# Patient Record
Sex: FEMALE | Race: BLACK OR AFRICAN AMERICAN | NOT HISPANIC OR LATINO | Employment: FULL TIME | URBAN - METROPOLITAN AREA
[De-identification: names, ages, dates, MRNs, and addresses within clinical notes are randomized per-mention and may not be internally consistent; named-entity substitution may affect disease eponyms.]

---

## 2017-01-04 NOTE — PROGRESS NOTES
"  SUBJECTIVE:                                                    Ruthy Cameron is a 36 year old female who presents to clinic today for the following health issues:      Patient here to have her right big toe nail looked at. Pain is 10/10 and she is unable to walk from the pain. Patient reported the pain started about a month ago but has gotten worse.     First noticed it about a month ago. She has had this rmoved before. No pus or drainage. Even a light touch hurts. Can't put her shoe on all the way.     Problem list and histories reviewed & adjusted, as indicated.  Additional history: as documented    Problem list, Medication list, Allergies, and Medical/Social/Surgical histories reviewed in [facility] and updated as appropriate.    ROS:  C: NEGATIVE for fever, chills, change in weight  E/M: NEGATIVE for ear, mouth and throat problems  R: NEGATIVE for significant cough or SOB  CV: NEGATIVE for chest pain, palpitations or peripheral edema    OBJECTIVE:                                                    /85 mmHg  Pulse 87  Temp(Src) 98.5  F (36.9  C) (Oral)  Ht 5' 5.5\" (1.664 m)  Wt 208 lb (94.348 kg)  BMI 34.07 kg/m2  SpO2 99%  LMP 12/26/2016  Body mass index is 34.07 kg/(m^2).  GENERAL: healthy, alert and no distress  SKIN: Right great toe with nail curling in on both edges. Pain with even light touch to the surrounding skin.     Diagnostic Test Results:  none      ASSESSMENT/PLAN:                                                    1. Ingrowing nail, right great toe  Referred to podiatry. Tramadol for pain to tolerate show.   - traMADol (ULTRAM) 50 MG tablet; Take 1-2 tablets ( mg) by mouth every 6 hours as needed for pain maximum 4 tablet(s) per day  Dispense: 20 tablet; Refill: 0  - PODIATRY/FOOT & ANKLE SURGERY REFERRAL        Elinor Dickey PA-C  [facility]    "

## 2017-01-05 ENCOUNTER — OFFICE VISIT (OUTPATIENT)
Dept: PODIATRY | Facility: CLINIC | Age: 37
End: 2017-01-05
Payer: COMMERCIAL

## 2017-01-05 ENCOUNTER — OFFICE VISIT (OUTPATIENT)
Dept: FAMILY MEDICINE | Facility: CLINIC | Age: 37
End: 2017-01-05
Payer: COMMERCIAL

## 2017-01-05 VITALS
DIASTOLIC BLOOD PRESSURE: 85 MMHG | TEMPERATURE: 98.5 F | OXYGEN SATURATION: 99 % | HEIGHT: 66 IN | BODY MASS INDEX: 33.43 KG/M2 | SYSTOLIC BLOOD PRESSURE: 135 MMHG | HEART RATE: 87 BPM | WEIGHT: 208 LBS

## 2017-01-05 VITALS — WEIGHT: 208 LBS | BODY MASS INDEX: 34.07 KG/M2 | OXYGEN SATURATION: 98 % | HEART RATE: 86 BPM

## 2017-01-05 DIAGNOSIS — L60.0 INGROWING NAIL, RIGHT GREAT TOE: Primary | ICD-10-CM

## 2017-01-05 DIAGNOSIS — L60.0 INGROWING NAIL: Primary | ICD-10-CM

## 2017-01-05 PROCEDURE — 99213 OFFICE O/P EST LOW 20 MIN: CPT | Performed by: PHYSICIAN ASSISTANT

## 2017-01-05 PROCEDURE — 99203 OFFICE O/P NEW LOW 30 MIN: CPT | Mod: 25 | Performed by: PODIATRIST

## 2017-01-05 PROCEDURE — 11730 AVULSION NAIL PLATE SIMPLE 1: CPT | Mod: T5 | Performed by: PODIATRIST

## 2017-01-05 RX ORDER — TRAMADOL HYDROCHLORIDE 50 MG/1
50-100 TABLET ORAL EVERY 6 HOURS PRN
Qty: 20 TABLET | Refills: 0 | Status: SHIPPED | OUTPATIENT
Start: 2017-01-05 | End: 2018-01-08

## 2017-01-05 ASSESSMENT — PAIN SCALES - GENERAL: PAINLEVEL: WORST PAIN (10)

## 2017-01-05 NOTE — PROGRESS NOTES
Subjective:    Pt is seen today in consult from Elinor Dickey w/ kajal c/c of a painful ingrown right great nail both border.  This has been problematic for 1 month(s).  negativehistory of drainage from the site. This is slowly getting worse.  Aggravated by activity and relieved by rest.  Has tried soaking which has not helped.   denies history of trauma to the area.  Was on Lamisil in the past for three months but did not change nails.  Smoker.  She drives for [employer].      ROS:  Denies fever and chill, denies numbness and tingling, denies erythema on dorsum of foot.    Past Medical History   Diagnosis Date     SYD II (cervical intraepithelial neoplasia II) 6/18/98     High risk HPV infection 4/23/13     normal pap     LSIL (low grade squamous intraepithelial lesion) on Pap smear 5/2014     + HR HPV 58.      High risk HPV infection 5/6/15     colp 6/30/15 no high grade dysplasia       Past Surgical History   Procedure Laterality Date     No history of surgery         Family History   Problem Relation Age of Onset     Neurologic Disorder Father 40     Brain Aneurysm     Hypertension Father      CANCER Paternal Grandmother      Breast Cancer Other        Social History   Substance Use Topics     Smoking status: Current Every Day Smoker -- 0.50 packs/day     Types: Cigarettes     Smokeless tobacco: Never Used     Alcohol Use: Yes      Comment: Monthly         Current outpatient prescriptions:      traMADol (ULTRAM) 50 MG tablet, Take 1-2 tablets ( mg) by mouth every 6 hours as needed for pain maximum 4 tablet(s) per day, Disp: 20 tablet, Rfl: 0     etonogestrel-ethinyl estradiol (NUVARING) 0.12-0.015 MG/24HR vaginal ring, Place 1 each vaginally every 28 days, Disp: 3 each, Rfl: 3     omeprazole (PRILOSEC) 20 MG capsule, Take 1 capsule (20 mg) by mouth daily, Disp: 90 capsule, Rfl: 3     terbinafine (LAMISIL) 250 MG tablet, Take 1 tablet (250 mg) by mouth daily, Disp: 30 tablet, Rfl: 2     No Known Allergies    Pulse 86   Wt 94.348 kg (208 lb)  SpO2 98%  LMP 12/26/2016.  A&O X 3.  Good historian.  Pulses DP, PT 2/4 b/l.  CRT < 3 seconds X 10 digits.  No edema or varicosities noted.  Sensation to light touch intact b/l.  Reflexes 2/4 b/l.  Skin has normal texture and turgor b/l.  Normal arch with weightbearing.  No forefoot or rear foot deformities noted.  MS 5/5 all compartments.  Normal ROM all fore foot and rearfoot joints.  No equinus.  right great toe nailboth border shows soft tissue impingement with localized erythema.   negative active drainage/purulence at this time.  No sinus tracts.  No nailbed masses or exostosis.  No pain with range of motion of IPJ or MTPJ.  No ascending cellulitis.    ASSESSMENT:    Onychocryptosis with paronychia right toe.    Discussed etiology and treatment options in detail w/ the pt.  The potential causes and nature of an ingrown toenail were discussed with the patient.  We reviewed the natural history/prognosis of the condition and potential risks if no treatment is provided.      Treatment options discussed included conservative management (oral antibiotics, soaking of foot, adequate width shoes)  as well as surgical management (partial or total nail removal).  The pros and cons of both forms of treatment were reviewed.  Handout given to patient.      After thorough discussion and answering all questions, the patient elected to have nail avulsion.  Obtained consent, used 3cc of 1% lidocaine plain to block right first toe.  Sterile prep, then avulsed the affected border(s).  No evidence of deep abscess noted.  Pt tolerated procedure well.  Sterile bandage placed, gave wound care instruction.  If returns in the future discussed permanent.  Return to clinic prn.  Thank you for allowing me participate in the care of this patient.        Figueroa Saleh DPM, FACFAS

## 2017-01-05 NOTE — MR AVS SNAPSHOT
After Visit Summary   1/5/2017    Ruthy Cameron    MRN: [id number]           Patient Information     Date Of Birth          1980        Visit Information        Provider Department      1/5/2017 9:00 AM Elinor Dickey PA-C [facility]        Today's Diagnoses     Ingrowing nail, right great toe    -  1        Follow-ups after your visit        Additional Services     PODIATRY/FOOT & ANKLE SURGERY REFERRAL       Your provider has referred you to: [facility]: [facility] [phone]   [email or web address]    Please be aware that coverage of these services is subject to the terms and limitations of your health insurance plan.  Call member services at your health plan with any benefit or coverage questions.      Please bring the following to your appointment:  >>   Any x-rays, CTs or MRIs which have been performed.  Contact the facility where they were done to arrange for  prior to your scheduled appointment.    >>   List of current medications   >>   This referral request   >>   Any documents/labs given to you for this referral                  Your next 10 appointments already scheduled     Jan 05, 2017 12:45 PM   New Visit with Figueroa Saleh DPM   [facility] ([facility])    [address] [place] [id number] [phone]              Who to contact     If you have questions or need follow up information about today's clinic visit or your schedule please contact [facility] directly at [phone].  Normal or non-critical lab and imaging results will be communicated to you by MyChart, letter or phone within 4 business days after the clinic has received the results. If you do not hear from us within 7 days, please contact the clinic through MyChart or phone. If you have a critical or abnormal lab result, we will notify you by phone as soon as  "possible.  Submit refill requests through [employer] or call your pharmacy and they will forward the refill request to us. Please allow 3 business days for your refill to be completed.          Additional Information About Your Visit        [employer] Information     [employer] lets you send messages to your doctor, view your test results, renew your prescriptions, schedule appointments and more. To sign up, go to [email or web address] . Click on \"Log in\" on the left side of the screen, which will take you to the Welcome page. Then click on \"Sign up Now\" on the right side of the page.     You will be asked to enter the access code listed below, as well as some personal information. Please follow the directions to create your username and password.     Your access code is: [email or web address]  Expires: 2017  9:22 AM     Your access code will  in 90 days. If you need help or a new code, please call your [place] clinic or [phone].        Care EveryWhere ID     This is your Care EveryWhere ID. This could be used by other organizations to access your [place] medical records  [id number]        Your Vitals Were     Pulse Temperature Height BMI (Body Mass Index) Pulse Oximetry Last Period    87 98.5  F (36.9  C) (Oral) 5' 5.5\" (1.664 m) 34.07 kg/m2 99% 2016       Blood Pressure from Last 3 Encounters:   17 135/85   16 128/84   08/15/16 115/75    Weight from Last 3 Encounters:   17 208 lb (94.348 kg)   16 200 lb (90.719 kg)   08/15/16 201 lb (91.173 kg)              We Performed the Following     PODIATRY/FOOT & ANKLE SURGERY REFERRAL          Today's Medication Changes          These changes are accurate as of: 17  9:22 AM.  If you have any questions, ask your nurse or doctor.               Start taking these medicines.        Dose/Directions    traMADol 50 MG tablet   Commonly known as:  ULTRAM   Used for:  Ingrowing nail, right great toe   Started by:  Elinor Dickey PA-C        " Dose:   mg   Take 1-2 tablets ( mg) by mouth every 6 hours as needed for pain maximum 4 tablet(s) per day   Quantity:  20 tablet   Refills:  0            Where to get your medicines      Some of these will need a paper prescription and others can be bought over the counter.  Ask your nurse if you have questions.     Bring a paper prescription for each of these medications    - traMADol 50 MG tablet             Primary Care Provider Office Phone # Fax #    Elinor Dickey PA-C [phone]       [facility] [address] [facility] [address]        Thank you!     Thank you for choosing [facility]  for your care. Our goal is always to provide you with excellent care. Hearing back from our patients is one way we can continue to improve our services. Please take a few minutes to complete the written survey that you may receive in the mail after your visit with us. Thank you!             Your Updated Medication List - Protect others around you: Learn how to safely use, store and throw away your medicines at www.disposemymeds.org.          This list is accurate as of: 1/5/17  9:22 AM.  Always use your most recent med list.                   Brand Name Dispense Instructions for use    etonogestrel-ethinyl estradiol 0.12-0.015 MG/24HR vaginal ring    NUVARING    3 each    Place 1 each vaginally every 28 days       omeprazole 20 MG CR capsule    priLOSEC    90 capsule    Take 1 capsule (20 mg) by mouth daily       terbinafine 250 MG tablet    lamISIL    30 tablet    Take 1 tablet (250 mg) by mouth daily       traMADol 50 MG tablet    ULTRAM    20 tablet    Take 1-2 tablets ( mg) by mouth every 6 hours as needed for pain maximum 4 tablet(s) per day

## 2017-01-05 NOTE — NURSING NOTE
"Chief Complaint   Patient presents with     Ingrown Toenail     R great toe       Initial Pulse 86  Wt 94.348 kg (208 lb)  SpO2 98%  LMP 12/26/2016 Estimated body mass index is 34.07 kg/(m^2) as calculated from the following:    Height as of an earlier encounter on 1/5/17: 1.664 m (5' 5.5\").    Weight as of this encounter: 94.348 kg (208 lb).  BP completed using cuff size: NA (Not Taken)  "

## 2017-01-05 NOTE — MR AVS SNAPSHOT
After Visit Summary   1/5/2017    Ruthy Cameron    MRN: [id number]           Patient Information     Date Of Birth          1980        Visit Information        Provider Department      1/5/2017 12:45 PM Figueroa Saleh, DAWNA [facility]        Today's Diagnoses     Ingrowing nail    -  1       Care Instructions    Smoking Cessation    What's in cigarette smoke? - Cigarette smoke contains over 4,000 chemicals. Nicotine is one of the main ingredients which is an insecticide/herbicide. It is poisonous to our nervous system, increases blood clotting risk, and decreases the body's defenses to fight off infection. Another chemical is Carbon Monoxide is an asphyxiating gas that permanently binds to blood cells and blocks the transport of oxygen. This leads to tissue death and decreases your metabolism. Tar is a chemical that coats your lungs and trachea which impairs new oxygen coming in and carbon dioxide getting out of your body.   How does smoking impact surgery? - Smoking is particularly hazardous with regards to surgery. Surgery puts stress on the body and a smoker's body is already under strain from these chemicals. Putting the two together, especially for an elective surgery, could be a recipe for disaster. Smoking before and after surgery increases your risk of heart problems, slow wound healing, delayed bone healing, blood clots, wound infection and anesthesia complications.   What are the benefits of quitting? - In 20 minutes your blood pressure will drop back down to normal. In 8 hours the carbon monoxide (a toxic gas) levels in your blood stream will drop by half, and oxygen levels will return to normal. In 48 hours your chance of having a heart attack will have decreased. All nicotine will have left your body. Your sense of taste and smell will return to a normal level. In 72 hours your bronchial tubes will relax, and your energy levels will increase. In 2 weeks your circulation  will increase, and it will continue to improve for the next 10 weeks.    Recommendations for elective surgery - Ideally, patients should quit smoking 8 weeks before and at least 2 weeks after elective surgery in order to avoid complications. Simply cutting back on the amount of cigarettes smoked per day does not offer any benefit or decrease the risk of poor wound healing, heart problems, and infection. Smokers should also start taking Vitamin C and B for two weeks before surgery and two weeks after surgery.    Ways to Stop Smokin. Nicotine patches, lozenges, or gum  2. Support Groups  3. Medications (see below)    List of Medications:  1. Varenicline Tartrate (CHANTIX)   2. Bupropion HCL (WELLBUTRIN, ZYBAN) - note: make sure Wellbutrin is for smoking cessation and not other issues   3. Nicotine Patch (NICODERM)   4. Nicotine Inhaler (NICOTROL)   5. Nicotine Gum Nicotine Polacrilex   6. Nicotine Lozenge: Nicotine Polacrilex (COMMIT)   * [employer] offers a smoking support group as well!  Please visit: [email or web address]  If you are interested in these, ask about getting a prescription or talk to your primary care doctor about what may be the best way for you to quit.       Weight management plan: Patient was referred to their PCP to discuss a diet and exercise plan.          Follow-ups after your visit        Who to contact     If you have questions or need follow up information about today's clinic visit or your schedule please contact [facility] directly at [phone].  Normal or non-critical lab and imaging results will be communicated to you by MyChart, letter or phone within 4 business days after the clinic has received the results. If you do not hear from us within 7 days, please contact the clinic through MyChart or phone. If you have a critical or abnormal lab result, we will notify you by phone as soon as possible.  Submit refill requests through [employer] or call your  "pharmacy and they will forward the refill request to us. Please allow 3 business days for your refill to be completed.          Additional Information About Your Visit        MyChart Information     [employer] lets you send messages to your doctor, view your test results, renew your prescriptions, schedule appointments and more. To sign up, go to [email or web address] . Click on \"Log in\" on the left side of the screen, which will take you to the Welcome page. Then click on \"Sign up Now\" on the right side of the page.     You will be asked to enter the access code listed below, as well as some personal information. Please follow the directions to create your username and password.     Your access code is: [email or web address]  Expires: 2017  9:22 AM     Your access code will  in 90 days. If you need help or a new code, please call your [place] clinic or [phone].        Care EveryWhere ID     This is your Care EveryWhere ID. This could be used by other organizations to access your [place] medical records  [id number]        Your Vitals Were     Pulse Pulse Oximetry Last Period             86 98% 2016          Blood Pressure from Last 3 Encounters:   17 135/85   16 128/84   08/15/16 115/75    Weight from Last 3 Encounters:   17 94.348 kg (208 lb)   17 94.348 kg (208 lb)   16 90.719 kg (200 lb)              We Performed the Following     REMOVAL OF NAIL PLATE SIMPLE SINGLE          Today's Medication Changes          These changes are accurate as of: 17  2:06 PM.  If you have any questions, ask your nurse or doctor.               Start taking these medicines.        Dose/Directions    traMADol 50 MG tablet   Commonly known as:  ULTRAM   Used for:  Ingrowing nail, right great toe   Started by:  Elinor Dickey PA-C        Dose:   mg   Take 1-2 tablets ( mg) by mouth every 6 hours as needed for pain maximum 4 tablet(s) per day   Quantity:  20 tablet   Refills:  " 0            Where to get your medicines      Some of these will need a paper prescription and others can be bought over the counter.  Ask your nurse if you have questions.     Bring a paper prescription for each of these medications    - traMADol 50 MG tablet             Primary Care Provider Office Phone # Fax #    Elinor Dickey PA-C [phone]       [facility] [address] [facility] [address]        Thank you!     Thank you for choosing [facility]  for your care. Our goal is always to provide you with excellent care. Hearing back from our patients is one way we can continue to improve our services. Please take a few minutes to complete the written survey that you may receive in the mail after your visit with us. Thank you!             Your Updated Medication List - Protect others around you: Learn how to safely use, store and throw away your medicines at www.disposemymeds.org.          This list is accurate as of: 1/5/17  2:06 PM.  Always use your most recent med list.                   Brand Name Dispense Instructions for use    etonogestrel-ethinyl estradiol 0.12-0.015 MG/24HR vaginal ring    NUVARING    3 each    Place 1 each vaginally every 28 days       omeprazole 20 MG CR capsule    priLOSEC    90 capsule    Take 1 capsule (20 mg) by mouth daily       terbinafine 250 MG tablet    lamISIL    30 tablet    Take 1 tablet (250 mg) by mouth daily       traMADol 50 MG tablet    ULTRAM    20 tablet    Take 1-2 tablets ( mg) by mouth every 6 hours as needed for pain maximum 4 tablet(s) per day

## 2017-01-05 NOTE — PATIENT INSTRUCTIONS
Smoking Cessation    What's in cigarette smoke? - Cigarette smoke contains over 4,000 chemicals. Nicotine is one of the main ingredients which is an insecticide/herbicide. It is poisonous to our nervous system, increases blood clotting risk, and decreases the body's defenses to fight off infection. Another chemical is Carbon Monoxide is an asphyxiating gas that permanently binds to blood cells and blocks the transport of oxygen. This leads to tissue death and decreases your metabolism. Tar is a chemical that coats your lungs and trachea which impairs new oxygen coming in and carbon dioxide getting out of your body.   How does smoking impact surgery? - Smoking is particularly hazardous with regards to surgery. Surgery puts stress on the body and a smoker's body is already under strain from these chemicals. Putting the two together, especially for an elective surgery, could be a recipe for disaster. Smoking before and after surgery increases your risk of heart problems, slow wound healing, delayed bone healing, blood clots, wound infection and anesthesia complications.   What are the benefits of quitting? - In 20 minutes your blood pressure will drop back down to normal. In 8 hours the carbon monoxide (a toxic gas) levels in your blood stream will drop by half, and oxygen levels will return to normal. In 48 hours your chance of having a heart attack will have decreased. All nicotine will have left your body. Your sense of taste and smell will return to a normal level. In 72 hours your bronchial tubes will relax, and your energy levels will increase. In 2 weeks your circulation will increase, and it will continue to improve for the next 10 weeks.    Recommendations for elective surgery - Ideally, patients should quit smoking 8 weeks before and at least 2 weeks after elective surgery in order to avoid complications. Simply cutting back on the amount of cigarettes smoked per day does not offer any benefit or decrease the  risk of poor wound healing, heart problems, and infection. Smokers should also start taking Vitamin C and B for two weeks before surgery and two weeks after surgery.    Ways to Stop Smokin. Nicotine patches, lozenges, or gum  2. Support Groups  3. Medications (see below)    List of Medications:  1. Varenicline Tartrate (CHANTIX)   2. Bupropion HCL (WELLBUTRIN, ZYBAN)   note: make sure Wellbutrin is for smoking cessation and not other issues   3. Nicotine Patch (NICODERM)   4. Nicotine Inhaler (NICOTROL)   5. Nicotine Gum Nicotine Polacrilex   6. Nicotine Lozenge: Nicotine Polacrilex (COMMIT)   * [employer] offers a smoking support group as well!  Please visit: [email or web address]  If you are interested in these, ask about getting a prescription or talk to your primary care doctor about what may be the best way for you to quit.       Weight management plan: Patient was referred to their PCP to discuss a diet and exercise plan.

## 2017-01-05 NOTE — NURSING NOTE
"Chief Complaint   Patient presents with     Ingrown Toenail       Initial /85 mmHg  Pulse 87  Temp(Src) 98.5  F (36.9  C) (Oral)  Ht 5' 5.5\" (1.664 m)  Wt 208 lb (94.348 kg)  BMI 34.07 kg/m2  SpO2 99%  LMP 12/26/2016 Estimated body mass index is 34.07 kg/(m^2) as calculated from the following:    Height as of this encounter: 5' 5.5\" (1.664 m).    Weight as of this encounter: 208 lb (94.348 kg).  BP completed using cuff size: jatin Hong CMA      "

## 2017-02-27 DIAGNOSIS — K21.9 GASTROESOPHAGEAL REFLUX DISEASE WITHOUT ESOPHAGITIS: ICD-10-CM

## 2017-02-27 NOTE — TELEPHONE ENCOUNTER
omeprazole (PRILOSEC) 20 MG capsule      Last Written Prescription Date: 1-12-16  Last Fill Quantity: 90,  # refills: 3   Last Office Visit with [facility], [facility] or [facility] prescribing provider: 1-5-17

## 2017-03-03 ENCOUNTER — TELEPHONE (OUTPATIENT)
Dept: FAMILY MEDICINE | Facility: CLINIC | Age: 37
End: 2017-03-03

## 2017-03-03 NOTE — TELEPHONE ENCOUNTER
PA is needed for the medication, Nuvaring .     Insurance has been called.  Alternatives that are covered are: Patient's insurance has been CX as of Feb 28, 2017. Called patient and she is aware that we will have to wait to her insurance is reinstated to do the PA for her Nuvaring. Patient said that she would call us once she figures out her insurance.          Would you like to start PA or Rx alternative medication?    If PA, please list all medications this patient has tried along with any contraindications that may have been experienced in the past. Thank you.    Pharmacy: [facility]    Phone: [phone]    Insurance Plan: [employer]  Phone: [phone]  Pt ID: [id number]  Group:

## 2017-03-08 ENCOUNTER — TELEPHONE (OUTPATIENT)
Dept: FAMILY MEDICINE | Facility: CLINIC | Age: 37
End: 2017-03-08

## 2018-01-07 ENCOUNTER — NURSE TRIAGE (OUTPATIENT)
Dept: NURSING | Facility: CLINIC | Age: 38
End: 2018-01-07

## 2018-01-07 NOTE — TELEPHONE ENCOUNTER
Additional Information    Negative: [1] Caller is not with the adult (patient) AND [2] reporting urgent symptoms    Negative: Lab result questions    Negative: Medication questions    Negative: Caller cannot be reached by phone    Negative: Caller has already spoken to PCP or another triager    Negative: RN needs further essential information from caller in order to complete triage    Negative: Requesting regular office appointment    Negative: [1] Caller requesting NON-URGENT health information AND [2] PCP's office is the best resource    Negative: Health Information question, no triage required and triager able to answer question    General information question, no triage required and triager able to answer question    Protocols used: INFORMATION ONLY CALL-ADULT-    Ruthy calls and has a question about the Morning After Pill. RN answered caller's question and caller had no further questions.

## 2018-01-08 ENCOUNTER — OFFICE VISIT (OUTPATIENT)
Dept: FAMILY MEDICINE | Facility: CLINIC | Age: 38
End: 2018-01-08
Payer: COMMERCIAL

## 2018-01-08 VITALS
DIASTOLIC BLOOD PRESSURE: 88 MMHG | BODY MASS INDEX: 30.39 KG/M2 | WEIGHT: 193.6 LBS | HEIGHT: 67 IN | TEMPERATURE: 98.6 F | SYSTOLIC BLOOD PRESSURE: 136 MMHG | HEART RATE: 76 BPM

## 2018-01-08 DIAGNOSIS — Z72.0 TOBACCO ABUSE: ICD-10-CM

## 2018-01-08 DIAGNOSIS — Z30.012 ENCOUNTER FOR PRESCRIPTION OF EMERGENCY CONTRACEPTION: ICD-10-CM

## 2018-01-08 DIAGNOSIS — R03.0 ELEVATED BLOOD PRESSURE READING WITHOUT DIAGNOSIS OF HYPERTENSION: ICD-10-CM

## 2018-01-08 DIAGNOSIS — N64.52 NIPPLE DISCHARGE: Primary | ICD-10-CM

## 2018-01-08 DIAGNOSIS — Z30.011 ENCOUNTER FOR INITIAL PRESCRIPTION OF CONTRACEPTIVE PILLS: ICD-10-CM

## 2018-01-08 DIAGNOSIS — Z11.3 SCREEN FOR STD (SEXUALLY TRANSMITTED DISEASE): ICD-10-CM

## 2018-01-08 PROCEDURE — 87591 N.GONORRHOEAE DNA AMP PROB: CPT | Performed by: PHYSICIAN ASSISTANT

## 2018-01-08 PROCEDURE — 84443 ASSAY THYROID STIM HORMONE: CPT | Performed by: PHYSICIAN ASSISTANT

## 2018-01-08 PROCEDURE — 99214 OFFICE O/P EST MOD 30 MIN: CPT | Performed by: PHYSICIAN ASSISTANT

## 2018-01-08 PROCEDURE — 86803 HEPATITIS C AB TEST: CPT | Performed by: PHYSICIAN ASSISTANT

## 2018-01-08 PROCEDURE — 84146 ASSAY OF PROLACTIN: CPT | Performed by: PHYSICIAN ASSISTANT

## 2018-01-08 PROCEDURE — 36415 COLL VENOUS BLD VENIPUNCTURE: CPT | Performed by: PHYSICIAN ASSISTANT

## 2018-01-08 PROCEDURE — 86780 TREPONEMA PALLIDUM: CPT | Performed by: PHYSICIAN ASSISTANT

## 2018-01-08 PROCEDURE — 87491 CHLMYD TRACH DNA AMP PROBE: CPT | Performed by: PHYSICIAN ASSISTANT

## 2018-01-08 PROCEDURE — 87389 HIV-1 AG W/HIV-1&-2 AB AG IA: CPT | Performed by: PHYSICIAN ASSISTANT

## 2018-01-08 RX ORDER — ACETAMINOPHEN AND CODEINE PHOSPHATE 120; 12 MG/5ML; MG/5ML
1 SOLUTION ORAL DAILY
Qty: 28 TABLET | Refills: 5 | Status: SHIPPED | OUTPATIENT
Start: 2018-01-08 | End: 2018-02-19

## 2018-01-08 NOTE — LETTER
[facility]  [address] [place]  [address]  [phone]        January 10, 2018    Ruthy Cameron  [address]    [place] [address]        Dear Ruthy,    Your hormone testing was all normal. I would like you to get a mammogram and ultrasound to further investigate the nipple discharge.   You can call [phone] to schedule these procedures.     Results for orders placed or performed in visit on 01/08/18   Prolactin   Result Value Ref Range    Prolactin 4 3 - 27 ug/L   TSH with free T4 reflex   Result Value Ref Range    TSH 1.10 0.40 - 4.00 mU/L   Hepatitis C antibody   Result Value Ref Range    Hepatitis C Antibody Nonreactive NR^Nonreactive   HIV Antigen Antibody Combo   Result Value Ref Range    HIV Antigen Antibody Combo Nonreactive NR^Nonreactive       Anti Treponema   Result Value Ref Range    Treponema pallidum Antibody Negative NEG^Negative   Chlamydia trachomatis PCR   Result Value Ref Range    Specimen Description Urine     Chlamydia Trachomatis PCR Negative NEG^Negative   Neisseria gonorrhoeae PCR   Result Value Ref Range    Specimen Descrip Urine     N Gonorrhea PCR Negative NEG^Negative   If you have any questions please call the clinic at [phone].    Sincerely,    Elinor MOLINA

## 2018-01-08 NOTE — NURSING NOTE
"Chief Complaint   Patient presents with     Patient Inquiry     Birth Control     Health Maintenance     Influenza        Initial /85 (BP Location: Right arm, Patient Position: Chair, Cuff Size: Adult Large)  Pulse 76  Temp 98.6  F (37  C) (Oral)  Ht 5' 6.85\" (1.698 m)  Wt 193 lb 9.6 oz (87.8 kg)  Breastfeeding? No  BMI 30.46 kg/m2 Estimated body mass index is 30.46 kg/(m^2) as calculated from the following:    Height as of this encounter: 5' 6.85\" (1.698 m).    Weight as of this encounter: 193 lb 9.6 oz (87.8 kg).  Medication Reconciliation: complete     DENNIS Swenson MA      "

## 2018-01-08 NOTE — MR AVS SNAPSHOT
"              After Visit Summary   1/8/2018    Ruthy Cameron    MRN: [id number]           Patient Information     Date Of Birth          1980        Visit Information        Provider Department      1/8/2018 4:20 PM Elinor Dickey PA-C [facility]        Today's Diagnoses     Nipple discharge    -  1    Screen for STD (sexually transmitted disease)        Tobacco abuse        Elevated blood pressure reading without diagnosis of hypertension        Encounter for prescription of emergency contraception        Encounter for initial prescription of contraceptive pills          Care Instructions    Take plan B today.           Follow-ups after your visit        Who to contact     If you have questions or need follow up information about today's clinic visit or your schedule please contact [facility] directly at [phone].  Normal or non-critical lab and imaging results will be communicated to you by MyChart, letter or phone within 4 business days after the clinic has received the results. If you do not hear from us within 7 days, please contact the clinic through Crun[employer] or phone. If you have a critical or abnormal lab result, we will notify you by phone as soon as possible.  Submit refill requests through Obe[employer] or call your pharmacy and they will forward the refill request to us. Please allow 3 business days for your refill to be completed.          Additional Information About Your Visit        MyChart Information     Obeo lets you send messages to your doctor, view your test results, renew your prescriptions, schedule appointments and more. To sign up, go to [email or web address] . Click on \"Log in\" on the left side of the screen, which will take you to the Welcome page. Then click on \"Sign up Now\" on the right side of the page.     You will be asked to enter the access code listed below, as well as some personal information. Please follow the directions to " "create your username and password.     Your access code is: [email or web address]  Expires: 2018  7:56 AM     Your access code will  in 90 days. If you need help or a new code, please call your [place] clinic or [phone].        Care EveryWhere ID     This is your Care EveryWhere ID. This could be used by other organizations to access your [facility] medical records  [id number]        Your Vitals Were     Pulse Temperature Height Last Period Breastfeeding? BMI (Body Mass Index)    76 98.6  F (37  C) (Oral) 5' 6.85\" (1.698 m) 2017 No 30.46 kg/m2       Blood Pressure from Last 3 Encounters:   18 145/85   17 135/85   16 128/84    Weight from Last 3 Encounters:   18 193 lb 9.6 oz (87.8 kg)   17 208 lb (94.3 kg)   17 208 lb (94.3 kg)              We Performed the Following     Anti Treponema     Chlamydia trachomatis PCR     Hepatitis C antibody     HIV Antigen Antibody Combo     Neisseria gonorrhoeae PCR     Prolactin     TSH with free T4 reflex          Today's Medication Changes          These changes are accurate as of: 18  5:06 PM.  If you have any questions, ask your nurse or doctor.               Start taking these medicines.        Dose/Directions    levonorgestrel 0.75 MG tablet   Commonly known as:  PLAN B   Used for:  Encounter for prescription of emergency contraception   Started by:  Elinor Dickey PA-C        Dose:  2 tablet   Take 2 tablets (1.5 mg) by mouth once for 1 dose   Quantity:  2 tablet   Refills:  0       norethindrone 0.35 MG per tablet   Commonly known as:  MICRONOR   Used for:  Encounter for initial prescription of contraceptive pills   Started by:  Elinor Dickey PA-C        Dose:  1 tablet   Take 1 tablet (0.35 mg) by mouth daily   Quantity:  28 tablet   Refills:  5            Where to get your medicines      These medications were sent to [facility] [address] [place] [address], [place] - [address] [facility] [address]  [address], " [place] [address]     Phone:  [phone]     levonorgestrel 0.75 MG tablet    norethindrone 0.35 MG per tablet                Primary Care Provider Office Phone # Fax #    Elinor Dickey PA-C [phone] [address] [facility] [address]        Equal Access to Services     [facility] HospitalTOMMY : Hadii aad ku hadasho Soomaali, waaxda luqadaha, qaybta kaalmada adeegyada, waxay idiin hayaan adeeg kharash layvesherrera . So [facility] [phone].    ATENCIÓN: Si habla español, tiene a chua disposición servicios gratuitos de asistencia lingüística. Alta al [phone].    We comply with applicable federal civil rights laws and [place] laws. We do not discriminate on the basis of race, color, national origin, age, disability, sex, sexual orientation, or gender identity.            Thank you!     Thank you for choosing [facility]  for your care. Our goal is always to provide you with excellent care. Hearing back from our patients is one way we can continue to improve our services. Please take a few minutes to complete the written survey that you may receive in the mail after your visit with us. Thank you!             Your Updated Medication List - Protect others around you: Learn how to safely use, store and throw away your medicines at www.disposemymeds.org.          This list is accurate as of: 1/8/18  5:06 PM.  Always use your most recent med list.                   Brand Name Dispense Instructions for use Diagnosis    levonorgestrel 0.75 MG tablet    PLAN B    2 tablet    Take 2 tablets (1.5 mg) by mouth once for 1 dose    Encounter for prescription of emergency contraception       norethindrone 0.35 MG per tablet    MICRONOR    28 tablet    Take 1 tablet (0.35 mg) by mouth daily    Encounter for initial prescription of contraceptive pills       omeprazole 20 MG CR capsule    priLOSEC    90 capsule    Take 1 capsule (20 mg) by mouth daily    Gastroesophageal reflux disease  without esophagitis

## 2018-01-08 NOTE — PROGRESS NOTES
SUBJECTIVE:   Ruthy Cameron is a 37 year old female who presents to clinic today for the following health issues:      Pt is here to discuss birth control.        Patient does not have the money to purchase the plan B. She had the condom break yesterday morning. Would like plan B. Not on any contraception at this time.     Patient had liked the Nuvaring previously. Patient is still smoking about 5 cigarettes per day. She is considering quitting. Her blood pressure has been borderline for several visits as well.      Patient would be willing to have STD testing. No symptoms.       She has been having right sided nipple discharge since 9/2017. No odor. Clear discharge. She notes that the left side can have slight discharge, but a lot on the right. There is a stain in her bra. This happens 5 times per week.   Her maternal great aunt with breast cancer.   History of fibroadenoma removed on the left breast about 2 years ago.         Problem list and histories reviewed & adjusted, as indicated.  Additional history: as documented    Patient Active Problem List   Diagnosis     CARDIOVASCULAR SCREENING; LDL GOAL LESS THAN 160     Contraception management     High risk HPV infection     Breast mass     Elevated blood pressure reading without diagnosis of hypertension     Tobacco abuse     Gastroesophageal reflux disease without esophagitis     Past Surgical History:   Procedure Laterality Date     NO HISTORY OF SURGERY         Social History   Substance Use Topics     Smoking status: Current Every Day Smoker     Packs/day: 0.50     Types: Cigarettes     Smokeless tobacco: Never Used     Alcohol use Yes      Comment: Monthly     Family History   Problem Relation Age of Onset     Neurologic Disorder Father 40     Brain Aneurysm     Hypertension Father      CANCER Paternal Grandmother      Breast Cancer Other              Reviewed and updated as needed this visit by clinical staffTobacco  Allergies  Meds  Med Hx  Surg Hx   "Fam Hx  Soc Hx      Reviewed and updated as needed this visit by Provider         ROS:  Constitutional, HEENT, cardiovascular, pulmonary, gi and gu systems are negative, except as otherwise noted.      OBJECTIVE:   /88 (BP Location: Right arm, Patient Position: Chair, Cuff Size: Adult Large)  Pulse 76  Temp 98.6  F (37  C) (Oral)  Ht 5' 6.85\" (1.698 m)  Wt 193 lb 9.6 oz (87.8 kg)  LMP 01/29/2017  Breastfeeding? No  BMI 30.46 kg/m2  Body mass index is 30.46 kg/(m^2).  GENERAL: healthy, alert and no distress  BREAST: normal without masses, tenderness  and no palpable axillary masses or adenopathy. A pinpoint drop of clear nipple discharge expressed from the right nipple    Diagnostic Test Results:  none     ASSESSMENT/PLAN:       ICD-10-CM    1. Nipple discharge N64.52 Prolactin     TSH with free T4 reflex   2. Screen for STD (sexually transmitted disease) Z11.3 Chlamydia trachomatis PCR     Neisseria gonorrhoeae PCR     Hepatitis C antibody     HIV Antigen Antibody Combo     Anti Treponema   3. Tobacco abuse Z72.0    4. Elevated blood pressure reading without diagnosis of hypertension R03.0    5. Encounter for prescription of emergency contraception Z30.012 levonorgestrel (PLAN B) 0.75 MG tablet   6. Encounter for initial prescription of contraceptive pills Z30.011 norethindrone (MICRONOR) 0.35 MG per tablet   Will get labs for the nipple discharge. If labs normal will get mammo and u/s. STD screening today.   Plan B prescribed. Contraception discussed in length with patient. Risk of DVT was discussed. Decided on minipill for contraeption as patient is still smoking. Patient is resistant to sterilization or IUD placement. She will work on quitting smoking.   Will follow up once she has quit smoking to discuss alternate contraception options.         Elinor Dickey PA-C  [facility]  "

## 2018-01-08 NOTE — NURSING NOTE
"Chief Complaint   Patient presents with     Patient Inquiry     Birth Control     Health Maintenance     Influenza        Initial /88 (BP Location: Right arm, Patient Position: Chair, Cuff Size: Adult Large)  Pulse 76  Temp 98.6  F (37  C) (Oral)  Ht 5' 6.85\" (1.698 m)  Wt 193 lb 9.6 oz (87.8 kg)  LMP 01/29/2017  Breastfeeding? No  BMI 30.46 kg/m2 Estimated body mass index is 30.46 kg/(m^2) as calculated from the following:    Height as of this encounter: 5' 6.85\" (1.698 m).    Weight as of this encounter: 193 lb 9.6 oz (87.8 kg).  Medication Reconciliation: silverio Swenson MA      "

## 2018-01-09 LAB
HCV AB SERPL QL IA: NONREACTIVE
HIV 1+2 AB+HIV1 P24 AG SERPL QL IA: NONREACTIVE
PROLACTIN SERPL-MCNC: 4 UG/L (ref 3–27)
T PALLIDUM IGG+IGM SER QL: NEGATIVE
TSH SERPL DL<=0.005 MIU/L-ACNC: 1.1 MU/L (ref 0.4–4)

## 2018-01-10 DIAGNOSIS — N64.52 DISCHARGE FROM BOTH NIPPLES: Primary | ICD-10-CM

## 2018-01-10 LAB
C TRACH DNA SPEC QL NAA+PROBE: NEGATIVE
N GONORRHOEA DNA SPEC QL NAA+PROBE: NEGATIVE
SPECIMEN SOURCE: NORMAL
SPECIMEN SOURCE: NORMAL

## 2018-01-10 NOTE — PROGRESS NOTES
Your STD testing was negative.     Your hormone testing was all normal. I would like you to get a mammogram and ultrasound to further investigate the nipple discharge.   You can call [phone] to schedule these procedures.   Elinor Dickey PA-C

## 2018-01-23 ENCOUNTER — HOSPITAL ENCOUNTER (OUTPATIENT)
Dept: MAMMOGRAPHY | Facility: CLINIC | Age: 38
Discharge: HOME | End: 2018-01-23
Attending: PHYSICIAN ASSISTANT | Admitting: PHYSICIAN ASSISTANT
Payer: COMMERCIAL

## 2018-01-23 ENCOUNTER — HOSPITAL ENCOUNTER (OUTPATIENT)
Dept: MAMMOGRAPHY | Facility: CLINIC | Age: 38
End: 2018-01-23
Attending: PHYSICIAN ASSISTANT
Payer: COMMERCIAL

## 2018-01-23 DIAGNOSIS — N64.52 DISCHARGE FROM BOTH NIPPLES: ICD-10-CM

## 2018-01-23 DIAGNOSIS — N64.52 DISCHARGE FROM RIGHT NIPPLE: ICD-10-CM

## 2018-01-23 PROCEDURE — 77066 DX MAMMO INCL CAD BI: CPT

## 2018-01-23 PROCEDURE — 76642 ULTRASOUND BREAST LIMITED: CPT | Mod: RT

## 2018-02-19 ENCOUNTER — OFFICE VISIT (OUTPATIENT)
Dept: FAMILY MEDICINE | Facility: CLINIC | Age: 38
End: 2018-02-19
Payer: COMMERCIAL

## 2018-02-19 VITALS
SYSTOLIC BLOOD PRESSURE: 121 MMHG | WEIGHT: 211.8 LBS | TEMPERATURE: 98.6 F | HEART RATE: 75 BPM | DIASTOLIC BLOOD PRESSURE: 77 MMHG | BODY MASS INDEX: 33.32 KG/M2

## 2018-02-19 DIAGNOSIS — K21.9 GASTROESOPHAGEAL REFLUX DISEASE WITHOUT ESOPHAGITIS: ICD-10-CM

## 2018-02-19 DIAGNOSIS — Z30.41 ENCOUNTER FOR SURVEILLANCE OF CONTRACEPTIVE PILLS: ICD-10-CM

## 2018-02-19 DIAGNOSIS — Z32.00 PREGNANCY EXAMINATION OR TEST, PREGNANCY UNCONFIRMED: Primary | ICD-10-CM

## 2018-02-19 DIAGNOSIS — R03.0 ELEVATED BLOOD PRESSURE READING WITHOUT DIAGNOSIS OF HYPERTENSION: ICD-10-CM

## 2018-02-19 LAB — BETA HCG QUAL IFA URINE: NEGATIVE

## 2018-02-19 PROCEDURE — 84703 CHORIONIC GONADOTROPIN ASSAY: CPT | Performed by: PHYSICIAN ASSISTANT

## 2018-02-19 PROCEDURE — 99214 OFFICE O/P EST MOD 30 MIN: CPT | Performed by: PHYSICIAN ASSISTANT

## 2018-02-19 RX ORDER — ETONOGESTREL AND ETHINYL ESTRADIOL VAGINAL RING .015; .12 MG/D; MG/D
RING VAGINAL
Qty: 3 EACH | Refills: 3 | Status: SHIPPED | OUTPATIENT
Start: 2018-02-19 | End: 2018-10-29

## 2018-02-19 NOTE — MR AVS SNAPSHOT
"              After Visit Summary   2/19/2018    Ruthy Cameron    MRN: [id number]           Patient Information     Date Of Birth          1980        Visit Information        Provider Department      2/19/2018 3:20 PM Elinor Dickey PA-C [facility]        Today's Diagnoses     Pregnancy examination or test, pregnancy unconfirmed    -  1    Encounter for surveillance of contraceptive pills        Elevated blood pressure reading without diagnosis of hypertension        Gastroesophageal reflux disease without esophagitis          Care Instructions    You are due for a physical with pap smear in August 2019.             Follow-ups after your visit        Who to contact     If you have questions or need follow up information about today's clinic visit or your schedule please contact [facility] directly at [phone].  Normal or non-critical lab and imaging results will be communicated to you by Derma Scienceshart, letter or phone within 4 business days after the clinic has received the results. If you do not hear from us within 7 days, please contact the clinic through Derma Sciences[employer] or phone. If you have a critical or abnormal lab result, we will notify you by phone as soon as possible.  Submit refill requests through [employer] or call your pharmacy and they will forward the refill request to us. Please allow 3 business days for your refill to be completed.          Additional Information About Your Visit        MyChart Information     [employer] lets you send messages to your doctor, view your test results, renew your prescriptions, schedule appointments and more. To sign up, go to [email or web address] . Click on \"Log in\" on the left side of the screen, which will take you to the Welcome page. Then click on \"Sign up Now\" on the right side of the page.     You will be asked to enter the access code listed below, as well as some personal information. Please follow the directions to " create your username and password.     Your access code is: [email or web address]  Expires: 2018  3:56 PM     Your access code will  in 90 days. If you need help or a new code, please call your [facility] or [phone].        Care EveryWhere ID     This is your Care EveryWhere ID. This could be used by other organizations to access your [facility] medical records  [id number]        Your Vitals Were     Pulse Temperature Last Period Breastfeeding? BMI (Body Mass Index)       75 98.6  F (37  C) (Oral) 2018 No 33.32 kg/m2        Blood Pressure from Last 3 Encounters:   18 121/77   18 136/88   17 135/85    Weight from Last 3 Encounters:   18 211 lb 12.8 oz (96.1 kg)   18 193 lb 9.6 oz (87.8 kg)   17 208 lb (94.3 kg)              We Performed the Following     Beta HCG Qual, Urine - [facility] and [place] ([id number])          Today's Medication Changes          These changes are accurate as of 18  3:56 PM.  If you have any questions, ask your nurse or doctor.               Start taking these medicines.        Dose/Directions    etonogestrel-ethinyl estradiol 0.12-0.015 MG/24HR vaginal ring   Commonly known as:  NUVARING   Used for:  Encounter for surveillance of contraceptive pills   Started by:  Elinor Dickey PA-C        Insert 1 ring vaginally every 21 days then remove for 1 week then repeat with new ring.   Quantity:  3 each   Refills:  3            Where to get your medicines      These medications were sent to [id number] - [place] - [address] [facility] [address]  [address], [place] [address]     Phone:  [phone]     etonogestrel-ethinyl estradiol 0.12-0.015 MG/24HR vaginal ring    omeprazole 20 MG CR capsule                Primary Care Provider Office Phone # Fax #    Elinor Dickey PA-C [phone]       [address] [place] [address]        Equal Access to Services     ANISHA SHAIKH: Cely  simi Lee, walou raygozakingsleyha, qaluista kajason emilejane, waxyuko gauri holleyrustammari cespedesMeagannila hong. So [facility] [phone].    ATENCIÓN: Si habla español, tiene a chua disposición servicios gratuitos de asistencia lingüística. Magaliame al [phone].    We comply with applicable federal civil rights laws and [place] laws. We do not discriminate on the basis of race, color, national origin, age, disability, sex, sexual orientation, or gender identity.            Thank you!     Thank you for choosing [facility]  for your care. Our goal is always to provide you with excellent care. Hearing back from our patients is one way we can continue to improve our services. Please take a few minutes to complete the written survey that you may receive in the mail after your visit with us. Thank you!             Your Updated Medication List - Protect others around you: Learn how to safely use, store and throw away your medicines at www.disposemymeds.org.          This list is accurate as of 2/19/18  3:56 PM.  Always use your most recent med list.                   Brand Name Dispense Instructions for use Diagnosis    etonogestrel-ethinyl estradiol 0.12-0.015 MG/24HR vaginal ring    NUVARING    3 each    Insert 1 ring vaginally every 21 days then remove for 1 week then repeat with new ring.    Encounter for surveillance of contraceptive pills       omeprazole 20 MG CR capsule    priLOSEC    90 capsule    Take 1 capsule (20 mg) by mouth daily    Gastroesophageal reflux disease without esophagitis

## 2018-02-19 NOTE — NURSING NOTE
"Chief Complaint   Patient presents with     Contraception     Health Maintenance     Influenza        Initial /77 (BP Location: Right arm, Patient Position: Chair, Cuff Size: Adult Large)  Pulse 75  Temp 98.6  F (37  C) (Oral)  Wt 211 lb 12.8 oz (96.1 kg)  Breastfeeding? No  BMI 33.32 kg/m2 Estimated body mass index is 33.32 kg/(m^2) as calculated from the following:    Height as of 1/8/18: 5' 6.85\" (1.698 m).    Weight as of this encounter: 211 lb 12.8 oz (96.1 kg).  Medication Reconciliation: complete     DENNIS Swenson MA     "

## 2018-02-19 NOTE — PROGRESS NOTES
SUBJECTIVE:   Ruthy Cameorn is a 37 year old female who presents to clinic today for the following health issues:      Pt is here today to talk about birth control.       Patient has switched to an e-cig. Has quit smoking cigarettes for about 1 month.   Has been doing a 30 minute work out in the morning. Less cardio more stretching etc. Weight is u p since last office visit.   Had spotting on the minipill. Patient really would like to go back to the [facility].     Omeprazole completely resolves the heartburn. Does take it daily.     Problem list and histories reviewed & adjusted, as indicated.  Additional history: as documented    Patient Active Problem List   Diagnosis     CARDIOVASCULAR SCREENING; LDL GOAL LESS THAN 160     Contraception management     High risk HPV infection     Breast mass     Elevated blood pressure reading without diagnosis of hypertension     Gastroesophageal reflux disease without esophagitis     Past Surgical History:   Procedure Laterality Date     NO HISTORY OF SURGERY         Social History   Substance Use Topics     Smoking status: Former Smoker     Packs/day: 0.50     Types: Cigarettes     Quit date: 1/19/2018     Smokeless tobacco: Never Used     Alcohol use Yes      Comment: Monthly     Family History   Problem Relation Age of Onset     Neurologic Disorder Father 40     Brain Aneurysm     Hypertension Father      CANCER Paternal Grandmother      Breast Cancer Other            Reviewed and updated as needed this visit by clinical staff  Tobacco  Allergies  Meds  Problems  Med Hx  Surg Hx  Fam Hx  Soc Hx        Reviewed and updated as needed this visit by Provider  Allergies  Meds  Problems         ROS:  Constitutional, HEENT, cardiovascular, pulmonary, gi and gu systems are negative, except as otherwise noted.    OBJECTIVE:     /77 (BP Location: Right arm, Patient Position: Chair, Cuff Size: Adult Large)  Pulse 75  Temp 98.6  F (37  C) (Oral)  Wt 211 lb 12.8 oz  (96.1 kg)  LMP 02/06/2018  Breastfeeding? No  BMI 33.32 kg/m2  Body mass index is 33.32 kg/(m^2).  GENERAL: healthy, alert and no distress    Diagnostic Test Results:  Results for orders placed or performed in visit on 02/19/18   Beta HCG Qual, Urine - [facility] and [place] ([id number])   Result Value Ref Range    Beta HCG Qual IFA Urine Negative NEG^Negative            ASSESSMENT/PLAN:       ICD-10-CM    1. Pregnancy examination or test, pregnancy unconfirmed Z32.00 Beta HCG Qual, Urine - [facility] and [place] ([id number])   2. Encounter for surveillance of contraceptive pills Z30.41    3. Elevated blood pressure reading without diagnosis of hypertension R03.0    4. Gastroesophageal reflux disease without esophagitis K21.9 omeprazole (PRILOSEC) 20 MG CR capsule   Blood pressure has improved with quitting smoking.   Weight is up slightly, encouraged more cardio activities.   GERD is stable on omeprazole.   Can change back to the Nuvaring. Again discussed not smoking while using d/t increased risk of DVT.     FUTURE APPOINTMENTS:       - Follow-up for annual visit or as needed    Elinor Dickey PA-C  [facility]

## 2018-04-04 ENCOUNTER — OFFICE VISIT (OUTPATIENT)
Dept: PODIATRY | Facility: CLINIC | Age: 38
End: 2018-04-04
Payer: COMMERCIAL

## 2018-04-04 VITALS
WEIGHT: 211 LBS | HEART RATE: 80 BPM | BODY MASS INDEX: 33.91 KG/M2 | HEIGHT: 66 IN | SYSTOLIC BLOOD PRESSURE: 116 MMHG | DIASTOLIC BLOOD PRESSURE: 71 MMHG

## 2018-04-04 DIAGNOSIS — L60.0 INGROWING NAIL: Primary | ICD-10-CM

## 2018-04-04 PROCEDURE — 11730 AVULSION NAIL PLATE SIMPLE 1: CPT | Mod: T5 | Performed by: PODIATRIST

## 2018-04-04 ASSESSMENT — PAIN SCALES - GENERAL: PAINLEVEL: MODERATE PAIN (4)

## 2018-04-04 NOTE — NURSING NOTE
"Chief Complaint   Patient presents with     Establish Care     Toenail     right hallux       Initial Pulse 80  Ht 5' 6\" (1.676 m)  Wt 211 lb (95.7 kg)  BMI 34.06 kg/m2 Estimated body mass index is 34.06 kg/(m^2) as calculated from the following:    Height as of this encounter: 5' 6\" (1.676 m).    Weight as of this encounter: 211 lb (95.7 kg).  Medication Reconciliation: complete    "

## 2018-04-04 NOTE — PROGRESS NOTES
"PATIENT HISTORY:  Ruthy Cameron is a 37 year old female who presents to clinic for right 1st toenail pain.  Hx of ingrown nail.  4-10/10 pain.  Worse with pressure.  1 month duration.  No treatments reported.  Denies fevers, chills.  Family hx of DM.  Works part time.     EXAM:Vitals: /71  Pulse 80  Ht 5' 6\" (1.676 m)  Wt 211 lb (95.7 kg)  BMI 34.06 kg/m2  BMI= Body mass index is 34.06 kg/(m^2).    General appearance: Patient is alert and fully cooperative with history & exam.  No sign of distress is noted during the visit.     Dermatologic: Right hallux ingrown nail along lateral border with pain to pressure.  No evidence of soft tissue infection is noted.     Vascular: DP & PT pulses are intact & regular on the right.  No significant edema or varicosities noted.  CFT and skin temperature are normal.     Neurologic: Lower extremity sensation is intact to light touch.  No evidence of weakness or contracture in the lower extremities.  No evidence of neuropathy.     Musculoskeletal: Patient is ambulatory without assistive device or brace.  No gross ankle deformity noted.  No foot or ankle joint effusion is noted.     ASSESSMENT: Right hallux ingrown nail     PLAN:  Reviewed patient's chart in epic.  Discussed condition and treatment options including pros and cons.    The potential causes and nature of an ingrown toenail were discussed with the patient.  We reviewed the natural history/prognosis of the condition and potential risks if no treatment is provided.      Treatment options discussed included conservative management (oral antibiotics, soaking of foot, adequate width shoes)  as well as surgical management (partial or total nail removal).  The pros and cons of both forms of treatment were reviewed.      After thorough discussion and answering all questions, the patient elected to undergo non permanent partial avulsion of the nail.  Permanent was offered, pt declined.  Discussed risks of infection, " recurrence, wound healing problems.    Procedure:  In addition to office visit.  After consent, the right 1st toe was anesthetized with 5cc's of 1% lidocaine plain after alcohol prep. Betadine prep performed.  A tourniquet was applied to the toe. Using sterile instrumentation, the lateral border was then raised from the nail bed and then cut the length of the nail.  The offending nail border was then removed.  Bacitracin was applied to the nail bed.  The tourniquet was removed and a hyperemic response was noted.  Bandage was applied to the toe.  The patient tolerated the procedure and anesthesia well.    Post op instructions provided and discussed with pt.  F/u prn.      Gilberto Chamberlain DPM, FACFAS    Weight management plan: Patient was referred to their PCP to discuss a diet and exercise plan.

## 2018-04-04 NOTE — PATIENT INSTRUCTIONS
Dr Chamberlain Clinic Locations        Mondays Tuesdays   [facility] [place]   [address]   [place] [address] [place] [address]   ph: [phone] ph: [phone]   fax: [phone] fax: [phone]       Wednesdays Thursdays - Surgery   [facility] Scheduling - [place]: [phone]   [address] [place] [address] To Schedule an appointment please call:   ph: [phone]   fax: [phone]      Follow up as needed    Ingrown Toenail          What Is an Ingrown Toenail?  When a toenail is ingrown, it is curved and grows into the skin, usually at the nail borders (the sides of the nail). This  digging in  of the nail irritates the skin, often creating pain, redness, swelling, and warmth in the toe.  If an ingrown nail causes a break in the skin, bacteria may enter and cause an infection in the area, which is often marked by drainage and a foul odor. However, even if the toe isn t painful, red, swollen, or warm, a nail that curves downward into the skin can progress to an infection.  Causes  Causes of ingrown toenails include:  Heredity. In many people, the tendency for ingrown toenails is inherited.   Trauma. Sometimes an ingrown toenail is the result of trauma, such as stubbing your toe, having an object fall on your toe, or engaging in activities that involve repeated pressure on the toes, such as kicking or running.   Improper trimming. The most common cause of ingrown toenails is cutting your nails too short. This encourages the skin next to the nail to fold over the nail.   Improperly sized footwear. Ingrown toenails can result from wearing socks and shoes that are tight or short.   Nail Conditions. Ingrown toenails can be caused by nail problems, such as fungal infections or losing a nail due to trauma.   Treatment  Sometimes initial treatment for ingrown toenails can be safely  performed at home. However, home treatment is strongly discouraged if an infection is suspected, or for those who have medical conditions that put feet at high risk, such as diabetes, nerve damage in the foot, or poor circulation.  Home care:  If you don t have an infection or any of the above medical conditions, you can soak your foot in room-temperature water (adding Epsom s salt may be recommended by your doctor), and gently massage the side of the nail fold to help reduce the inflammation.  Avoid attempting  bathroom surgery.  Repeated cutting of the nail can cause the condition to worsen over time. If your symptoms fail to improve, it s time to see a foot and ankle surgeon.  Physician care:  After examining the toe, the foot and ankle surgeon will select the treatment best suited for you. If an infection is present, an oral antibiotic may be prescribed.  Sometimes a minor surgical procedure, often performed in the office, will ease the pain and remove the offending nail. After applying a local anesthetic, the doctor removes part of the nail s side border. Some nails may become ingrown again, requiring removal of the nail root.  Following the nail procedure, a light bandage will be applied. Most people experience very little pain after surgery and may resume normal activity the next day. If your surgeon has prescribed an oral antibiotic, be sure to take all the medication, even if your symptoms have improved.      Preventing Ingrown Toenails  Many cases of ingrown toenails may be prevented by:  Proper trimming. Cut toenails in a fairly straight line, and don t cut them too short. You should be able to get your fingernail under the sides and end of the nail.   Well-fitted shoes and socks. Don t wear shoes that are short or tight in the toe area. Avoid shoes that are loose, because they too cause pressure on the toes, especially when running or walking briskly.           INGROWN TOENAIL POSTOPERATIVE INSTRUCTIONS    (Nail avulsion or chemical matrixectomy)   1 Go directly home and elevate the affected foot on one or two pillows for the remainder of the day/evening. Your toe may stay numb for the next 2-8 hours.   2 Take Tylenol, ibuprofen or another anti-inflammatory as needed for pain.   3 Take antibiotic if that has been prescribed. Take the entire prescribed antibiotic even if your symptoms have improved.   4 Keep dressing dry and intact the day of the procedure. The morning after the procedure, remove entire dressing and soak/wash the affected area in warm water (you may add Epsom salt) for 5 to 10 minutes. Do this twice a day for 2-4 weeks (6-8 weeks if you had phenol) (you may count showering/bathing as one soak).  After soaks, pat the area dry and then allow to airdry for a few minutes. Apply antibiotic ointment to the area and cover with 2 X 2 gauze and paper tape or a band-aid.  5 May walk and pursue everyday activities as tolerated with either an open toe shoe or cut-out shoe as needed. May wear regular shoes if no pain is noted.  6 Watch for any signs and symptoms of infection such as: redness, red streaks going up the foot/leg, swelling, pus or foul odor. Those that have had the phenol procedure, the toe will drain longer and will look like it is infected because it is a chemical burn.  Please call with questions.        Body Mass Index (BMI)  Many things can cause foot and ankle problems. Foot structure, activity level, foot mechanics and injuries are common causes of pain.  One very important issue that often goes unmentioned is body weight. Extra weight can cause increased stress on muscles, ligaments, bones and tendons. Sometimes just a few extra pounds is all it takes to put one over her/his threshold. Without reducing that stress, it can be difficult to alleviate pain.   Some people are uncomfortable addressing this issue, but we feel it is important for you to think about it. As Foot & Ankle specialists,  our job is addressing the lower extremity problem and possible causes.   Regarding extra body weight, we encourage patients to discuss diet and weight management plans with their primary care doctors. It is this team approach that gives you the best opportunity for pain relief and getting you back on your feet.

## 2018-04-04 NOTE — LETTER
"    4/4/2018         RE: Ruthy Cameron  [address]    [place] [address]        Dear Colleague,    Thank you for referring your patient, Ruthy Cameron, to the [facility]. Please see a copy of my visit note below.    PATIENT HISTORY:  Ruthy Cameron is a 37 year old female who presents to clinic for right 1st toenail pain.  Hx of ingrown nail.  4-10/10 pain.  Worse with pressure.  1 month duration.  No treatments reported.  Denies fevers, chills.  Family hx of DM.  Works part time.     EXAM:Vitals: /71  Pulse 80  Ht 5' 6\" (1.676 m)  Wt 211 lb (95.7 kg)  BMI 34.06 kg/m2  BMI= Body mass index is 34.06 kg/(m^2).    General appearance: Patient is alert and fully cooperative with history & exam.  No sign of distress is noted during the visit.     Dermatologic: Right hallux ingrown nail along lateral border with pain to pressure.  No evidence of soft tissue infection is noted.     Vascular: DP & PT pulses are intact & regular on the right.  No significant edema or varicosities noted.  CFT and skin temperature are normal.     Neurologic: Lower extremity sensation is intact to light touch.  No evidence of weakness or contracture in the lower extremities.  No evidence of neuropathy.     Musculoskeletal: Patient is ambulatory without assistive device or brace.  No gross ankle deformity noted.  No foot or ankle joint effusion is noted.     ASSESSMENT: Right hallux ingrown nail     PLAN:  Reviewed patient's chart in epic.  Discussed condition and treatment options including pros and cons.    The potential causes and nature of an ingrown toenail were discussed with the patient.  We reviewed the natural history/prognosis of the condition and potential risks if no treatment is provided.      Treatment options discussed included conservative management (oral antibiotics, soaking of foot, adequate width shoes)  as well as surgical management (partial or total nail removal).  " The pros and cons of both forms of treatment were reviewed.      After thorough discussion and answering all questions, the patient elected to undergo non permanent partial avulsion of the nail.  Permanent was offered, pt declined.  Discussed risks of infection, recurrence, wound healing problems.    Procedure:  In addition to office visit.  After consent, the right 1st toe was anesthetized with 5cc's of 1% lidocaine plain after alcohol prep. Betadine prep performed.  A tourniquet was applied to the toe. Using sterile instrumentation, the lateral border was then raised from the nail bed and then cut the length of the nail.  The offending nail border was then removed.  Bacitracin was applied to the nail bed.  The tourniquet was removed and a hyperemic response was noted.  Bandage was applied to the toe.  The patient tolerated the procedure and anesthesia well.    Post op instructions provided and discussed with pt.  F/u prn.      Gilberto Chamberlain DPM, FACFAS    Weight management plan: Patient was referred to their PCP to discuss a diet and exercise plan.        Again, thank you for allowing me to participate in the care of your patient.        Sincerely,        Gilberto Chamberlain DPM

## 2018-04-04 NOTE — MR AVS SNAPSHOT
After Visit Summary   4/4/2018    Ruthy Cameron    MRN: [id number]           Patient Information     Date Of Birth          1980        Visit Information        Provider Department      4/4/2018 9:45 AM Gilberto Chamberlain DPM [facility]        Today's Diagnoses     Ingrowing nail    -  1      Care Instructions      Dr Chamberlain Clinic Locations        Mondays Tuesdays   [facility] [place]   [address]   [place] [address] [place] [address]   ph: [phone] ph: [phone]   fax: [phone] fax: [phone]       Wednesdays Thursdays - Surgery   [facility] [place] [facility] Scheduling - [place]: [phone]   [address] [place] [address] To Schedule an appointment please call:   ph: [phone]   fax: [phone]      Follow up as needed    Ingrown Toenail          What Is an Ingrown Toenail?  When a toenail is ingrown, it is curved and grows into the skin, usually at the nail borders (the sides of the nail). This  digging in  of the nail irritates the skin, often creating pain, redness, swelling, and warmth in the toe.  If an ingrown nail causes a break in the skin, bacteria may enter and cause an infection in the area, which is often marked by drainage and a foul odor. However, even if the toe isn t painful, red, swollen, or warm, a nail that curves downward into the skin can progress to an infection.  Causes  Causes of ingrown toenails include:  Heredity. In many people, the tendency for ingrown toenails is inherited.   Trauma. Sometimes an ingrown toenail is the result of trauma, such as stubbing your toe, having an object fall on your toe, or engaging in activities that involve repeated pressure on the toes, such as kicking or running.   Improper trimming. The most common cause of ingrown toenails is cutting your nails too short. This encourages the skin  next to the nail to fold over the nail.   Improperly sized footwear. Ingrown toenails can result from wearing socks and shoes that are tight or short.   Nail Conditions. Ingrown toenails can be caused by nail problems, such as fungal infections or losing a nail due to trauma.   Treatment  Sometimes initial treatment for ingrown toenails can be safely performed at home. However, home treatment is strongly discouraged if an infection is suspected, or for those who have medical conditions that put feet at high risk, such as diabetes, nerve damage in the foot, or poor circulation.  Home care:  If you don t have an infection or any of the above medical conditions, you can soak your foot in room-temperature water (adding Epsom s salt may be recommended by your doctor), and gently massage the side of the nail fold to help reduce the inflammation.  Avoid attempting  bathroom surgery.  Repeated cutting of the nail can cause the condition to worsen over time. If your symptoms fail to improve, it s time to see a foot and ankle surgeon.  Physician care:  After examining the toe, the foot and ankle surgeon will select the treatment best suited for you. If an infection is present, an oral antibiotic may be prescribed.  Sometimes a minor surgical procedure, often performed in the office, will ease the pain and remove the offending nail. After applying a local anesthetic, the doctor removes part of the nail s side border. Some nails may become ingrown again, requiring removal of the nail root.  Following the nail procedure, a light bandage will be applied. Most people experience very little pain after surgery and may resume normal activity the next day. If your surgeon has prescribed an oral antibiotic, be sure to take all the medication, even if your symptoms have improved.      Preventing Ingrown Toenails  Many cases of ingrown toenails may be prevented by:  Proper trimming. Cut toenails in a fairly straight line, and don t cut  them too short. You should be able to get your fingernail under the sides and end of the nail.   Well-fitted shoes and socks. Don t wear shoes that are short or tight in the toe area. Avoid shoes that are loose, because they too cause pressure on the toes, especially when running or walking briskly.           INGROWN TOENAIL POSTOPERATIVE INSTRUCTIONS   (Nail avulsion or chemical matrixectomy)   1 Go directly home and elevate the affected foot on one or two pillows for the remainder of the day/evening. Your toe may stay numb for the next 2-8 hours.   2 Take Tylenol, ibuprofen or another anti-inflammatory as needed for pain.   3 Take antibiotic if that has been prescribed. Take the entire prescribed antibiotic even if your symptoms have improved.   4 Keep dressing dry and intact the day of the procedure. The morning after the procedure, remove entire dressing and soak/wash the affected area in warm water (you may add Epsom salt) for 5 to 10 minutes. Do this twice a day for 2-4 weeks (6-8 weeks if you had phenol) (you may count showering/bathing as one soak).  After soaks, pat the area dry and then allow to airdry for a few minutes. Apply antibiotic ointment to the area and cover with 2 X 2 gauze and paper tape or a band-aid.  5 May walk and pursue everyday activities as tolerated with either an open toe shoe or cut-out shoe as needed. May wear regular shoes if no pain is noted.  6 Watch for any signs and symptoms of infection such as: redness, red streaks going up the foot/leg, swelling, pus or foul odor. Those that have had the phenol procedure, the toe will drain longer and will look like it is infected because it is a chemical burn.  Please call with questions.        Body Mass Index (BMI)  Many things can cause foot and ankle problems. Foot structure, activity level, foot mechanics and injuries are common causes of pain.  One very important issue that often goes unmentioned is body weight. Extra weight can cause  "increased stress on muscles, ligaments, bones and tendons. Sometimes just a few extra pounds is all it takes to put one over her/his threshold. Without reducing that stress, it can be difficult to alleviate pain.   Some people are uncomfortable addressing this issue, but we feel it is important for you to think about it. As Foot & Ankle specialists, our job is addressing the lower extremity problem and possible causes.   Regarding extra body weight, we encourage patients to discuss diet and weight management plans with their primary care doctors. It is this team approach that gives you the best opportunity for pain relief and getting you back on your feet.             Follow-ups after your visit        Follow-up notes from your care team     Return if symptoms worsen or fail to improve.      Who to contact     If you have questions or need follow up information about today's clinic visit or your schedule please contact [facility] directly at [phone].  Normal or non-critical lab and imaging results will be communicated to you by MyChart, letter or phone within 4 business days after the clinic has received the results. If you do not hear from us within 7 days, please contact the clinic through [employer] or phone. If you have a critical or abnormal lab result, we will notify you by phone as soon as possible.  Submit refill requests through [employer] or call your pharmacy and they will forward the refill request to us. Please allow 3 business days for your refill to be completed.          Additional Information About Your Visit        [employer] Information     [employer] lets you send messages to your doctor, view your test results, renew your prescriptions, schedule appointments and more. To sign up, go to [email or web address] . Click on \"Log in\" on the left side of the screen, which will take you to the Welcome page. Then click on \"Sign up Now\" on the right side of the page.     You will be asked to " "enter the access code listed below, as well as some personal information. Please follow the directions to create your username and password.     Your access code is: [email or web address]  Expires: 2018  4:56 PM     Your access code will  in 90 days. If you need help or a new code, please call your [place] clinic or [phone].        Care EveryWhere ID     This is your Care EveryWhere ID. This could be used by other organizations to access your [place] medical records  [id number]        Your Vitals Were     Pulse Height BMI (Body Mass Index)             80 5' 6\" (1.676 m) 34.06 kg/m2          Blood Pressure from Last 3 Encounters:   18 116/71   18 121/77   18 136/88    Weight from Last 3 Encounters:   18 211 lb (95.7 kg)   18 211 lb 12.8 oz (96.1 kg)   18 193 lb 9.6 oz (87.8 kg)              Today, you had the following     No orders found for display       Primary Care Provider Office Phone # Fax #    Elinor Dickey PA-C [phone]       [address] [address]        Equal Access to Services     ANISHA ARIAS : Hadii simi ku hadasho Soomaali, waaxda luqadaha, qaybta kaalmada adeegyada, waxay idiin haylizettn jeannine pitts. So [facility] [phone].    ATENCIÓN: Si habla español, tiene a chua disposición servicios gratuitos de asistencia lingüística. Llame al [phone].    We comply with applicable federal civil rights laws and [place] laws. We do not discriminate on the basis of race, color, national origin, age, disability, sex, sexual orientation, or gender identity.            Thank you!     Thank you for choosing [facility]  for your care. Our goal is always to provide you with excellent care. Hearing back from our patients is one way we can continue to improve our services. Please take a few minutes to complete the written survey that you may receive in the mail after your visit with us. Thank you!           "   Your Updated Medication List - Protect others around you: Learn how to safely use, store and throw away your medicines at www.disposemymeds.org.          This list is accurate as of 4/4/18 10:44 AM.  Always use your most recent med list.                   Brand Name Dispense Instructions for use Diagnosis    etonogestrel-ethinyl estradiol 0.12-0.015 MG/24HR vaginal ring    NUVARING    3 each    Insert 1 ring vaginally every 21 days then remove for 1 week then repeat with new ring.    Encounter for surveillance of contraceptive pills       omeprazole 20 MG CR capsule    priLOSEC    90 capsule    Take 1 capsule (20 mg) by mouth daily    Gastroesophageal reflux disease without esophagitis

## 2018-08-13 ENCOUNTER — HOSPITAL ENCOUNTER (OUTPATIENT)
Dept: MAMMOGRAPHY | Facility: CLINIC | Age: 38
Discharge: HOME | End: 2018-08-13
Attending: PHYSICIAN ASSISTANT | Admitting: PHYSICIAN ASSISTANT
Payer: COMMERCIAL

## 2018-08-13 DIAGNOSIS — N64.9 BREAST LESION: ICD-10-CM

## 2018-08-13 PROCEDURE — 76642 ULTRASOUND BREAST LIMITED: CPT | Mod: RT

## 2018-10-06 ENCOUNTER — TRANSFERRED RECORDS (OUTPATIENT)
Dept: HEALTH INFORMATION MANAGEMENT | Facility: CLINIC | Age: 38
End: 2018-10-06

## 2018-10-06 LAB
CREAT SERPL-MCNC: 0.86 MG/DL (ref 0.55–1.02)
GFR SERPL CREATININE-BSD FRML MDRD: >60 ML/MIN/1.73M2
GLUCOSE SERPL-MCNC: 88 MG/DL (ref 70–100)
POTASSIUM SERPL-SCNC: 4 MMOL/L (ref 3.5–5.2)

## 2018-10-29 ENCOUNTER — OFFICE VISIT (OUTPATIENT)
Dept: FAMILY MEDICINE | Facility: CLINIC | Age: 38
End: 2018-10-29
Payer: COMMERCIAL

## 2018-10-29 VITALS
TEMPERATURE: 98.3 F | BODY MASS INDEX: 30.96 KG/M2 | HEART RATE: 81 BPM | DIASTOLIC BLOOD PRESSURE: 76 MMHG | WEIGHT: 191.8 LBS | SYSTOLIC BLOOD PRESSURE: 112 MMHG

## 2018-10-29 DIAGNOSIS — I10 HTN, GOAL BELOW 140/90: Primary | ICD-10-CM

## 2018-10-29 DIAGNOSIS — Z30.41 ENCOUNTER FOR SURVEILLANCE OF CONTRACEPTIVE PILLS: ICD-10-CM

## 2018-10-29 PROCEDURE — 99214 OFFICE O/P EST MOD 30 MIN: CPT | Performed by: PHYSICIAN ASSISTANT

## 2018-10-29 RX ORDER — AMLODIPINE BESYLATE 5 MG/1
5 TABLET ORAL DAILY
Qty: 90 TABLET | Refills: 3 | Status: SHIPPED | OUTPATIENT
Start: 2018-10-29 | End: 2019-04-25

## 2018-10-29 RX ORDER — AMLODIPINE BESYLATE 5 MG/1
5 TABLET ORAL DAILY
COMMUNITY
Start: 2018-10-06 | End: 2018-10-29

## 2018-10-29 NOTE — PATIENT INSTRUCTIONS
When you become sexually active please make an appointment with the Midwives at our [facility] to discuss birth control options.

## 2018-10-29 NOTE — PROGRESS NOTES
SUBJECTIVE:   Ruthy Cameron is a 38 year old female who presents to clinic today for the following health issues:      Hypertension Follow-up      Outpatient blood pressures are being checked at home.  Results are 167/99, 134/90, 120's/70's-80's.    Low Salt Diet: low salt    Amount of exercise or physical activity: 2-3 days/week for an average of 30-45 minutes    Problems taking medications regularly: No    Medication side effects: none    Diet: low salt    Patient has been in the ED 2 times in the last month for headaches and elevated BP. EKG's in the ED were negative. The second time they started amlodipine.   She has been feeling better since starting the blood pressure medications. No headaches.   No chest pain, shortness of breath or swelling in the legs.   BMP in the ED was normal.     Patient has an prescription for the nuvaring, but has not been using it for the last 5 months as she is not sexually active. She does not desire a pregnancy at this time.     Reviewed care everywhere.     Problem list and histories reviewed & adjusted, as indicated.  Additional history: as documented    Patient Active Problem List   Diagnosis     CARDIOVASCULAR SCREENING; LDL GOAL LESS THAN 160     Contraception management     High risk HPV infection     Breast mass     Gastroesophageal reflux disease without esophagitis     HTN, goal below 140/90     Past Surgical History:   Procedure Laterality Date     NO HISTORY OF SURGERY         Social History   Substance Use Topics     Smoking status: Former Smoker     Packs/day: 0.50     Types: Cigarettes     Quit date: 1/19/2018     Smokeless tobacco: Never Used     Alcohol use Yes      Comment: Monthly     Family History   Problem Relation Age of Onset     Neurologic Disorder Father 40     Brain Aneurysm     Hypertension Father      Cancer Paternal Grandmother      Breast Cancer Other            Reviewed and updated as needed this visit by clinical staff  Tobacco  Allergies  Meds   Problems  Med Hx  Surg Hx  Fam Hx  Soc Hx        Reviewed and updated as needed this visit by Provider  Allergies  Meds  Problems         ROS:  Constitutional, HEENT, cardiovascular, pulmonary, gi and gu systems are negative, except as otherwise noted.    OBJECTIVE:     /76 (BP Location: Right arm, Patient Position: Chair, Cuff Size: Adult Large)  Pulse 81  Temp 98.3  F (36.8  C) (Oral)  Wt 191 lb 12.8 oz (87 kg)  LMP 10/25/2018  BMI 30.96 kg/m2  Body mass index is 30.96 kg/(m^2).  GENERAL: healthy, alert and no distress  RESP: lungs clear to auscultation - no rales, rhonchi or wheezes  CV: regular rate and rhythm, normal S1 S2, no S3 or S4, no murmur, click or rub, no peripheral edema     Diagnostic Test Results:  none     ASSESSMENT/PLAN:       ICD-10-CM    1. HTN, goal below 140/90 I10    2. Encounter for surveillance of contraceptive pills Z30.41    We discussed HTN and contraception. I discussed patient should consider alternative contraception and not restart the nuvaring. She will follow up if she becomes sexually active again.   Continue with amlodipine.     FUTURE APPOINTMENTS:       - Follow-up visit in 1 year.     Elinor Dickey PA-C  [facility]

## 2018-10-29 NOTE — MR AVS SNAPSHOT
After Visit Summary   10/29/2018    Ruthy Cameron    MRN: [id number]           Patient Information     Date Of Birth          1980        Visit Information        Provider Department      10/29/2018 6:20 PM Elinor Dickey PA-C [facility]        Today's Diagnoses     HTN, goal below 140/90    -  1    Encounter for surveillance of contraceptive pills          Care Instructions    When you become sexually active please make an appointment with the Midwives at our [facility] clinic to discuss birth control options.           Follow-ups after your visit        Follow-up notes from your care team     Return in about 1 year (around 10/29/2019) for Physical Exam.      Who to contact     If you have questions or need follow up information about today's clinic visit or your schedule please contact [facility] directly at [phone].  Normal or non-critical lab and imaging results will be communicated to you by MyChart, letter or phone within 4 business days after the clinic has received the results. If you do not hear from us within 7 days, please contact the clinic through MyChart or phone. If you have a critical or abnormal lab result, we will notify you by phone as soon as possible.  Submit refill requests through [employer] or call your pharmacy and they will forward the refill request to us. Please allow 3 business days for your refill to be completed.          Additional Information About Your Visit        Care EveryWhere ID     This is your Care EveryWhere ID. This could be used by other organizations to access your [facility] medical records  [id number]        Your Vitals Were     Pulse Temperature Last Period BMI (Body Mass Index)          81 98.3  F (36.8  C) (Oral) 10/25/2018 30.96 kg/m2         Blood Pressure from Last 3 Encounters:   10/29/18 112/76   04/04/18 116/71   02/19/18 121/77    Weight from Last 3 Encounters:   10/29/18 191 lb 12.8 oz  (87 kg)   04/04/18 211 lb (95.7 kg)   02/19/18 211 lb 12.8 oz (96.1 kg)              Today, you had the following     No orders found for display         Where to get your medicines      These medications were sent to [id number] - [place], [place] - [address] [place] [facility] [place] [address], [place] [address]     Phone:  [phone]     amLODIPine 5 MG tablet          Primary Care Provider Office Phone # Fax #    Elinor Dickey PA-C [phone]       [address] [facility] [address]        Equal Access to Services     ANISHA ARIAS : Hadii simi roldan hadasho Soomaali, waaxda luqadaha, qaybta kaalmada adeegyada, bisi pitts. So [facility] [phone].    ATENCIÓN: Si habla español, tiene a chua disposición servicios gratuitos de asistencia lingüística. Llame al [phone].    We comply with applicable federal civil rights laws and [place] laws. We do not discriminate on the basis of race, color, national origin, age, disability, sex, sexual orientation, or gender identity.            Thank you!     Thank you for choosing [facility]  for your care. Our goal is always to provide you with excellent care. Hearing back from our patients is one way we can continue to improve our services. Please take a few minutes to complete the written survey that you may receive in the mail after your visit with us. Thank you!             Your Updated Medication List - Protect others around you: Learn how to safely use, store and throw away your medicines at www.disposemymeds.org.          This list is accurate as of 10/29/18  6:51 PM.  Always use your most recent med list.                   Brand Name Dispense Instructions for use Diagnosis    amLODIPine 5 MG tablet    NORVASC    90 tablet    Take 1 tablet (5 mg) by mouth daily    HTN, goal below 140/90       omeprazole 20 MG CR capsule    priLOSEC    90 capsule    Take 1 capsule (20  mg) by mouth daily    Gastroesophageal reflux disease without esophagitis

## 2019-03-20 ENCOUNTER — ANCILLARY PROCEDURE (OUTPATIENT)
Dept: ULTRASOUND IMAGING | Facility: CLINIC | Age: 39
End: 2019-03-20
Attending: PHYSICIAN ASSISTANT
Payer: MEDICAID

## 2019-03-20 ENCOUNTER — ANCILLARY PROCEDURE (OUTPATIENT)
Dept: MAMMOGRAPHY | Facility: CLINIC | Age: 39
End: 2019-03-20
Attending: PHYSICIAN ASSISTANT
Payer: MEDICAID

## 2019-03-20 DIAGNOSIS — N63.0 BREAST MASS: ICD-10-CM

## 2019-03-20 PROCEDURE — 77066 DX MAMMO INCL CAD BI: CPT | Performed by: RADIOLOGY

## 2019-03-20 PROCEDURE — G0279 TOMOSYNTHESIS, MAMMO: HCPCS | Performed by: RADIOLOGY

## 2019-03-20 PROCEDURE — 76642 ULTRASOUND BREAST LIMITED: CPT | Mod: RT | Performed by: RADIOLOGY

## 2019-03-26 ENCOUNTER — ANCILLARY PROCEDURE (OUTPATIENT)
Dept: MAMMOGRAPHY | Facility: CLINIC | Age: 39
End: 2019-03-26
Attending: PHYSICIAN ASSISTANT
Payer: MEDICAID

## 2019-03-26 ENCOUNTER — ANCILLARY PROCEDURE (OUTPATIENT)
Dept: ULTRASOUND IMAGING | Facility: CLINIC | Age: 39
End: 2019-03-26
Attending: PHYSICIAN ASSISTANT
Payer: MEDICAID

## 2019-03-26 DIAGNOSIS — N63.0 BREAST MASS: ICD-10-CM

## 2019-03-26 PROCEDURE — 77053 X-RAY OF MAMMARY DUCT: CPT | Performed by: RADIOLOGY

## 2019-03-26 PROCEDURE — 19030 NJX PX ONLY MAM DUCTO/GLCTO: CPT | Mod: RT | Performed by: RADIOLOGY

## 2019-03-26 PROCEDURE — 76642 ULTRASOUND BREAST LIMITED: CPT | Mod: RT | Performed by: RADIOLOGY

## 2019-03-26 RX ORDER — IOPAMIDOL 408 MG/ML
5 INJECTION, SOLUTION INTRATHECAL ONCE
Status: COMPLETED | OUTPATIENT
Start: 2019-03-26 | End: 2019-03-26

## 2019-03-26 RX ADMIN — IOPAMIDOL 5 ML: 408 INJECTION, SOLUTION INTRATHECAL at 14:30

## 2019-04-25 ENCOUNTER — OFFICE VISIT (OUTPATIENT)
Dept: FAMILY MEDICINE | Facility: CLINIC | Age: 39
End: 2019-04-25
Payer: COMMERCIAL

## 2019-04-25 VITALS
HEIGHT: 67 IN | OXYGEN SATURATION: 99 % | RESPIRATION RATE: 12 BRPM | TEMPERATURE: 99 F | WEIGHT: 187 LBS | HEART RATE: 75 BPM | DIASTOLIC BLOOD PRESSURE: 82 MMHG | SYSTOLIC BLOOD PRESSURE: 118 MMHG | BODY MASS INDEX: 29.35 KG/M2

## 2019-04-25 DIAGNOSIS — I10 HTN, GOAL BELOW 140/90: ICD-10-CM

## 2019-04-25 DIAGNOSIS — Z11.3 SCREEN FOR STD (SEXUALLY TRANSMITTED DISEASE): ICD-10-CM

## 2019-04-25 DIAGNOSIS — N76.0 VAGINOSIS: Primary | ICD-10-CM

## 2019-04-25 DIAGNOSIS — B96.89 BACTERIAL VAGINITIS: ICD-10-CM

## 2019-04-25 DIAGNOSIS — N76.0 BACTERIAL VAGINITIS: ICD-10-CM

## 2019-04-25 LAB
SPECIMEN SOURCE: ABNORMAL
WET PREP SPEC: ABNORMAL

## 2019-04-25 PROCEDURE — 87591 N.GONORRHOEAE DNA AMP PROB: CPT | Performed by: PHYSICIAN ASSISTANT

## 2019-04-25 PROCEDURE — 87210 SMEAR WET MOUNT SALINE/INK: CPT | Performed by: PHYSICIAN ASSISTANT

## 2019-04-25 PROCEDURE — 99213 OFFICE O/P EST LOW 20 MIN: CPT | Performed by: PHYSICIAN ASSISTANT

## 2019-04-25 PROCEDURE — 87491 CHLMYD TRACH DNA AMP PROBE: CPT | Performed by: PHYSICIAN ASSISTANT

## 2019-04-25 RX ORDER — METRONIDAZOLE 500 MG/1
500 TABLET ORAL 2 TIMES DAILY
Qty: 14 TABLET | Refills: 0 | Status: SHIPPED | OUTPATIENT
Start: 2019-04-25 | End: 2019-05-02

## 2019-04-25 ASSESSMENT — MIFFLIN-ST. JEOR: SCORE: 1556.89

## 2019-04-25 NOTE — PROGRESS NOTES
"  SUBJECTIVE:   Ruthy Cameron is a 38 year old female who presents to clinic today for the following   health issues:      Vaginal Symptoms     Would also like STD testing      Duration: started 5 days ago     Description  vaginal discharge - white creamy curd-like and smells     Intensity:  severe    Accompanying signs and symptoms (fever/dysuria/abdominal or back pain): None    History  Sexually active: yes, single partner, contraception - spermicide   Possibility of pregnancy: No  Recent antibiotic use: no     Precipitating or alleviating factors: None    Therapies tried and outcome: none   Outcome: n/a          Additional history: Since we have seen her last, she has worked on some dietary and lifestyle changes, and has stopped both her norvasc and omeprazole.  She is not having anymore reflux, and BP has been at [facility].    She would also like to get GONOCOCCAL and chlamydia test done.    Reviewed  and updated as needed this visit by clinical staff  Tobacco  Allergies  Meds         Reviewed and updated as needed this visit by Provider         BP Readings from Last 3 Encounters:   04/25/19 118/82   10/29/18 112/76   04/04/18 116/71    Wt Readings from Last 3 Encounters:   04/25/19 84.8 kg (187 lb)   10/29/18 87 kg (191 lb 12.8 oz)   04/04/18 95.7 kg (211 lb)                    ROS:  Constitutional, HEENT, cardiovascular, pulmonary, gi and gu systems are negative, except as otherwise noted.    OBJECTIVE:     /82 (BP Location: Right arm, Patient Position: Sitting, Cuff Size: Adult Large)   Pulse 75   Temp 99  F (37.2  C) (Oral)   Resp 12   Ht 1.695 m (5' 6.75\")   Wt 84.8 kg (187 lb)   SpO2 99%   Breastfeeding? No   BMI 29.51 kg/m    Body mass index is 29.51 kg/m .  GENERAL: alert and no distress  EYES: Eyes grossly normal to inspection  PSYCH: mentation appears normal, affect normal/bright    Diagnostic Test Results:  Results for orders placed or performed in visit on 04/25/19 (from the past 24 " hour(s))   Wet prep   Result Value Ref Range    Specimen Description Vagina     Wet Prep No Trichomonas seen     Wet Prep Moderate  Clue cells seen   (A)     Wet Prep No yeast seen     Wet Prep Few  WBC'S seen          ASSESSMENT/PLAN:             1. Vaginosis    - Wet prep    2. Bacterial vaginitis  Avoidance of alcohol.  - metroNIDAZOLE (FLAGYL) 500 MG tablet; Take 1 tablet (500 mg) by mouth 2 times daily for 7 days  Dispense: 14 tablet; Refill: 0    3. Screen for STD (sexually transmitted disease)  Will contact with results.  - NEISSERIA GONORRHOEA PCR  - CHLAMYDIA TRACHOMATIS PCR    4. HTN, goal below 140/90  At goal without medication at this point.        She will be due for well exam in the fall with PCP.    Jamison Enciso PA-C  [facility]

## 2019-04-25 NOTE — NURSING NOTE
"Chief Complaint   Patient presents with     Vaginal Problem     /82 (BP Location: Right arm, Patient Position: Sitting, Cuff Size: Adult Large)   Pulse 75   Temp 99  F (37.2  C) (Oral)   Resp 12   Ht 1.695 m (5' 6.75\")   Wt 84.8 kg (187 lb)   SpO2 99%   Breastfeeding? No   BMI 29.51 kg/m   Estimated body mass index is 29.51 kg/m  as calculated from the following:    Height as of this encounter: 1.695 m (5' 6.75\").    Weight as of this encounter: 84.8 kg (187 lb).  bp completed using cuff size: large       Health Maintenance addressed:  NONE    n/a    Andrés Nelson MA     "

## 2019-04-26 ENCOUNTER — TELEPHONE (OUTPATIENT)
Dept: FAMILY MEDICINE | Facility: CLINIC | Age: 39
End: 2019-04-26

## 2019-04-26 NOTE — TELEPHONE ENCOUNTER
Jamison Enciso PA-C  P Up Triage             Please call with results.     Negative GONOCOCCAL and chlamydia.     Bobby Enciso PA-C

## 2019-04-30 ENCOUNTER — OFFICE VISIT (OUTPATIENT)
Dept: FAMILY MEDICINE | Facility: CLINIC | Age: 39
End: 2019-04-30
Payer: COMMERCIAL

## 2019-04-30 VITALS
SYSTOLIC BLOOD PRESSURE: 130 MMHG | BODY MASS INDEX: 29.66 KG/M2 | TEMPERATURE: 99.2 F | WEIGHT: 189 LBS | DIASTOLIC BLOOD PRESSURE: 76 MMHG | HEIGHT: 67 IN | OXYGEN SATURATION: 100 % | HEART RATE: 70 BPM

## 2019-04-30 DIAGNOSIS — N63.0 BREAST MASS: ICD-10-CM

## 2019-04-30 DIAGNOSIS — S46.811A STRAIN OF RIGHT TRAPEZIUS MUSCLE, INITIAL ENCOUNTER: Primary | ICD-10-CM

## 2019-04-30 DIAGNOSIS — N64.52 NIPPLE DISCHARGE: ICD-10-CM

## 2019-04-30 PROCEDURE — 99213 OFFICE O/P EST LOW 20 MIN: CPT | Performed by: PHYSICIAN ASSISTANT

## 2019-04-30 ASSESSMENT — MIFFLIN-ST. JEOR: SCORE: 1565.96

## 2019-04-30 NOTE — Clinical Note
Patient requests call about information for breast surgeon consult.  Would you be able to pass that info along?

## 2019-05-06 ENCOUNTER — TELEPHONE (OUTPATIENT)
Dept: FAMILY MEDICINE | Facility: CLINIC | Age: 39
End: 2019-05-06

## 2019-05-06 NOTE — TELEPHONE ENCOUNTER
Jamison Enciso PA-C  P Up Triage             Patient requests call about information for breast surgeon consult.  Would you be able to pass that info along?

## 2019-05-06 NOTE — PROGRESS NOTES
"  SUBJECTIVE:   Ruthy Cameron is a 38 year old female who presents to clinic today for the following   health issues:              Additional history: 37 y/o female here to discuss a couple of issues.  She has had this ongoing recurrent sensation in her right upper back.  Maybe a bit of pain and tightness.  Does not stop her from doing anything.  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  She has not done anything to try and treat.    She has been also dealing with chronic nipple discharge for many months.  She has been working with PCP.  She has had labs, mammogram, US, and recently a ductogram.  She was told that everything was normal and if symptoms persist or worsen she should consult with breast surgeon.      Reviewed  and updated as needed this visit by clinical staff  Tobacco  Allergies  Meds         Reviewed and updated as needed this visit by Provider         BP Readings from Last 3 Encounters:   04/30/19 130/76   04/25/19 118/82   10/29/18 112/76    Wt Readings from Last 3 Encounters:   04/30/19 85.7 kg (189 lb)   04/25/19 84.8 kg (187 lb)   10/29/18 87 kg (191 lb 12.8 oz)                    ROS:  Constitutional, HEENT, cardiovascular, pulmonary, gi and gu systems are negative, except as otherwise noted.    OBJECTIVE:     /76 (BP Location: Left arm, Cuff Size: Adult Regular)   Pulse 70   Temp 99.2  F (37.3  C) (Oral)   Ht 1.695 m (5' 6.75\")   Wt 85.7 kg (189 lb)   LMP 04/06/2019   SpO2 100%   BMI 29.82 kg/m    Body mass index is 29.82 kg/m .  GENERAL: alert and no distress  EYES: Eyes grossly normal to inspection  RESP: lungs clear to auscultation - no rales, rhonchi or wheezes  CV: regular rate and rhythm, normal S1 S2, no S3 or S4, no murmur, click or rub, no peripheral edema and peripheral pulses strong  MS: mild tenderness right trapezius and distally into mid back.  Full active ROM in right shoulder and cervical spine.    Diagnostic Test Results:  none "     ASSESSMENT/PLAN:             1. Strain of right trapezius muscle, initial encounter  Will have her ice the area after work, and start nsaid for the next 10-14 days.      2. Nipple discharge    - GENERAL SURG ADULT REFERRAL    3. Breast mass    - GENERAL SURG ADULT REFERRAL        Jamison Enciso PA-C  [facility] [place] [facility]

## 2019-05-06 NOTE — TELEPHONE ENCOUNTER
Called pt   She's at work right now and can't take the #   Asked that I callback and leave # on her VM - did this  Bhargavi MONTES RN

## 2019-06-09 ENCOUNTER — NURSE TRIAGE (OUTPATIENT)
Dept: NURSING | Facility: CLINIC | Age: 39
End: 2019-06-09

## 2019-06-09 NOTE — TELEPHONE ENCOUNTER
"Patient calling reporting having discomfort on \"burping\" after eating or drinking fluids. States she went to Urgent Care yesterday and was advised to take Mylanta. States she receives some relief from the Mylanta but it is not long term. Per guideline, advised patient to be seen within 24 hours. Patient states she will make an appointment with her primary to be seen tomorrow.     David Doan RN  [facility]       Reason for Disposition    [1] MODERATE pain (e.g., interferes with normal activities) AND [2] comes and goes (cramps) AND [3] present > 24 hours  (Exception: pain with Vomiting or Diarrhea - see that Guideline)    Additional Information    Negative: Severe difficulty breathing (e.g., struggling for each breath, speaks in single words)    Negative: Shock suspected (e.g., cold/pale/clammy skin, too weak to stand, low BP, rapid pulse)    Negative: Difficult to awaken or acting confused (e.g., disoriented, slurred speech)    Negative: Passed out (i.e., lost consciousness, collapsed and was not responding)    Negative: Visible sweat on face or sweat dripping down face    Negative: Sounds like a life-threatening emergency to the triager    Negative: [1] SEVERE pain (e.g., excruciating) AND [2] present > 1 hour    Negative: [1] Pain lasts > 10 minutes AND [2] age > 50    Negative: [1] Pain lasts > 10 minutes AND [2] age > 35 AND [3] at least one cardiac risk factor (i.e., hypertension, diabetes, obesity, smoker or strong family history of heart disease)    Negative: [1] Pain lasts > 10 minutes AND [2] history of heart disease (i.e., heart attack, bypass surgery, angina, angioplasty, CHF; not just a heart murmur)    Negative: [1] Pain lasts > 10 minutes AND [2] difficulty breathing    Negative: [1] Vomiting AND [2] contains red blood  (Exception: few streaks and only occurred once)    Negative: [1] Vomiting AND [2] contains black (\"coffee ground\") material    Negative: [1] Pain lasts > 10 minutes AND " [2] age > 40 AND [3] associated chest, arm, neck, upper back or jaw pain    Negative: Blood in bowel movements  (Exception: Blood on surface of BM with constipation)    Negative: Black or tarry bowel movements  (Exception: chronic-unchanged  black-grey bowel movements AND is taking iron pills or Pepto-bismol)    Negative: [1] Pregnant > 24 weeks AND [2] hand or face swelling    Negative: Patient sounds very sick or weak to the triager    Negative: [1] MILD-MODERATE pain AND [2] not relieved by antacids    Negative: [1] MILD-MODERATE pain AND [2] constant AND [3] present > 2 hours    Negative: [1] Vomiting AND [2] contains bile (green color)    Negative: [1] Vomiting AND [2] abdomen looks much more swollen than usual    Negative: White of the eyes have turned yellow (i.e., jaundice)    Negative: Fever > 103 F (39.4 C)    Negative: [1] Fever > 101 F (38.3 C) AND [2] age > 60    Negative: [1] Fever > 100.0 F (37.8 C) AND [2] bedridden (e.g., nursing home patient, CVA, chronic illness, recovering from surgery)    Negative: [1] Fever > 100.0 F (37.8 C) AND [2] diabetes mellitus or weak immune system (e.g., HIV positive, cancer chemo, splenectomy, chronic steroids)    Protocols used: ABDOMINAL PAIN - UPPER-A-AH

## 2019-06-10 ENCOUNTER — ANCILLARY PROCEDURE (OUTPATIENT)
Dept: ULTRASOUND IMAGING | Facility: CLINIC | Age: 39
End: 2019-06-10
Attending: INTERNAL MEDICINE
Payer: COMMERCIAL

## 2019-06-10 ENCOUNTER — OFFICE VISIT (OUTPATIENT)
Dept: FAMILY MEDICINE | Facility: CLINIC | Age: 39
End: 2019-06-10
Payer: COMMERCIAL

## 2019-06-10 VITALS
HEART RATE: 73 BPM | WEIGHT: 178 LBS | BODY MASS INDEX: 28.09 KG/M2 | DIASTOLIC BLOOD PRESSURE: 73 MMHG | SYSTOLIC BLOOD PRESSURE: 130 MMHG

## 2019-06-10 DIAGNOSIS — R10.13 ABDOMINAL PAIN, EPIGASTRIC: ICD-10-CM

## 2019-06-10 DIAGNOSIS — R10.13 ABDOMINAL PAIN, EPIGASTRIC: Primary | ICD-10-CM

## 2019-06-10 LAB
ALBUMIN SERPL-MCNC: 4.3 G/DL (ref 3.4–5)
ALP SERPL-CCNC: 65 U/L (ref 40–150)
ALT SERPL W P-5'-P-CCNC: 23 U/L (ref 0–50)
AST SERPL W P-5'-P-CCNC: 18 U/L (ref 0–45)
BASOPHILS # BLD AUTO: 0 10E9/L (ref 0–0.2)
BASOPHILS NFR BLD AUTO: 1 %
BILIRUB DIRECT SERPL-MCNC: 0.2 MG/DL (ref 0–0.2)
BILIRUB SERPL-MCNC: 0.6 MG/DL (ref 0.2–1.3)
DIFFERENTIAL METHOD BLD: ABNORMAL
EOSINOPHIL # BLD AUTO: 0 10E9/L (ref 0–0.7)
EOSINOPHIL NFR BLD AUTO: 1 %
ERYTHROCYTE [DISTWIDTH] IN BLOOD BY AUTOMATED COUNT: 11.7 % (ref 10–15)
ERYTHROCYTE [DISTWIDTH] IN BLOOD BY AUTOMATED COUNT: NORMAL % (ref 10–15)
HCT VFR BLD AUTO: 37.6 % (ref 35–47)
HCT VFR BLD AUTO: NORMAL % (ref 35–47)
HGB BLD-MCNC: 12.5 G/DL (ref 11.7–15.7)
HGB BLD-MCNC: NORMAL G/DL (ref 11.7–15.7)
LYMPHOCYTES # BLD AUTO: 1.9 10E9/L (ref 0.8–5.3)
LYMPHOCYTES NFR BLD AUTO: 53 %
MCH RBC QN AUTO: 27.9 PG (ref 26.5–33)
MCH RBC QN AUTO: NORMAL PG (ref 26.5–33)
MCHC RBC AUTO-ENTMCNC: 33.2 G/DL (ref 31.5–36.5)
MCHC RBC AUTO-ENTMCNC: NORMAL G/DL (ref 31.5–36.5)
MCV RBC AUTO: 84 FL (ref 78–100)
MCV RBC AUTO: NORMAL FL (ref 78–100)
MONOCYTES # BLD AUTO: 0.3 10E9/L (ref 0–1.3)
MONOCYTES NFR BLD AUTO: 9 %
NEUTROPHILS # BLD AUTO: 1.3 10E9/L (ref 1.6–8.3)
NEUTROPHILS NFR BLD AUTO: 36 %
PLATELET # BLD AUTO: 296 10E9/L (ref 150–450)
PLATELET # BLD AUTO: NORMAL 10E9/L (ref 150–450)
PLATELET # BLD EST: ABNORMAL 10*3/UL
PROT SERPL-MCNC: 8 G/DL (ref 6.8–8.8)
RBC # BLD AUTO: 4.48 10E12/L (ref 3.8–5.2)
RBC # BLD AUTO: NORMAL 10E12/L (ref 3.8–5.2)
RBC MORPH BLD: NORMAL
SMUDGE CELLS BLD QL SMEAR: PRESENT
WBC # BLD AUTO: 3.5 10E9/L (ref 4–11)
WBC # BLD AUTO: NORMAL 10E9/L (ref 4–11)

## 2019-06-10 PROCEDURE — 76705 ECHO EXAM OF ABDOMEN: CPT

## 2019-06-10 PROCEDURE — 87338 HPYLORI STOOL AG IA: CPT | Performed by: INTERNAL MEDICINE

## 2019-06-10 PROCEDURE — 99214 OFFICE O/P EST MOD 30 MIN: CPT | Performed by: INTERNAL MEDICINE

## 2019-06-10 PROCEDURE — 85025 COMPLETE CBC W/AUTO DIFF WBC: CPT | Performed by: INTERNAL MEDICINE

## 2019-06-10 PROCEDURE — 36415 COLL VENOUS BLD VENIPUNCTURE: CPT | Performed by: INTERNAL MEDICINE

## 2019-06-10 PROCEDURE — 80076 HEPATIC FUNCTION PANEL: CPT | Performed by: INTERNAL MEDICINE

## 2019-06-10 RX ORDER — MAGNESIUM HYDROXIDE/ALUMINUM HYDROXICE/SIMETHICONE 120; 1200; 1200 MG/30ML; MG/30ML; MG/30ML
30 SUSPENSION ORAL EVERY 4 HOURS PRN
COMMUNITY
End: 2020-08-13

## 2019-06-10 NOTE — PROGRESS NOTES
"Subjective     Ruthy Cameron is a 38 year old female who presents to clinic today for the following health issues:    HPI   GERD/Heartburn  Onset: 1 week    Description:     Burning in chest: No    Intensity: mild, moderate    Progression of Symptoms: worsening and intermittent    Accompanying Signs & Symptoms:  Does it feel like food gets stuck: no  Nausea: no  Vomiting (bloody?): no  Abdominal Pain: YES-- not really pain; pressure feeling, rumbling of noise in stomach  Black-Tarry stools: no:  Bloody stools: no    History:   Previous ulcers: no    Precipitating factors:   Caffeine use: no  Alcohol use: no  NSAID/Aspirin use: no  Tobacco use: no  Worse with alcohol and vaporing marijuana.    Alleviating factors:  Ivelises was working but now not helping    She is taking PTO bc her discomfort is so bad.     No black or bloody stool.    Feels like a bubble is in her chest.  Used to be on omeprazole and stopped 3 - 4 months ago b/c \"not having any heartburn anymore\"    Started a thc type of vape and the symptoms seemed to be triggered by this.  Stopped this use and symptoms just wont go away.  Rarely gets some relief with a burp.  Took baking soda w/water yesterday and did get temporary relief.     Her oldest son does have crohn's.       Therapies Tried and outcome:liquid antacid       Patient Active Problem List   Diagnosis     CARDIOVASCULAR SCREENING; LDL GOAL LESS THAN 160     Contraception management     High risk HPV infection     Breast mass     HTN, goal below 140/90     Past Surgical History:   Procedure Laterality Date     NO HISTORY OF SURGERY         Social History     Tobacco Use     Smoking status: Former Smoker     Packs/day: 0.50     Types: Cigarettes     Last attempt to quit: 2018     Years since quittin.3     Smokeless tobacco: Never Used   Substance Use Topics     Alcohol use: Yes     Comment: Monthly     Family History   Problem Relation Age of Onset     Neurologic Disorder Father 40        " Brain Aneurysm     Hypertension Father      Cancer Paternal Grandmother      Breast Cancer Other          Current Outpatient Medications   Medication Sig Dispense Refill     alum & mag hydroxide-simethicone (MYLANTA/MAALOX) 200-200-20 MG/5ML SUSP suspension Take 30 mLs by mouth every 4 hours as needed for indigestion       omeprazole (PRILOSEC) 20 MG DR capsule Take 2 capsules (40 mg) by mouth 2 times daily For 2 weeks, then reduce to one tab once daily 90 capsule 3     No Known Allergies  Recent Labs   Lab Test 10/06/18 01/08/18  1710 06/30/16  0942 04/23/13  1000   LDL  --   --   --  106   HDL  --   --   --  63   TRIG  --   --   --  53   ALT  --   --  27  --    CR 0.86  --  0.63  --    GFRESTIMATED >60  --  >90  Non  GFR Calc    --    GFRESTBLACK >60  --  >90  African American GFR Calc    --    POTASSIUM 4.0  --  3.7  --    TSH  --  1.10  --   --       BP Readings from Last 3 Encounters:   06/10/19 130/73   04/30/19 130/76   04/25/19 118/82    Wt Readings from Last 3 Encounters:   06/10/19 80.7 kg (178 lb)   04/30/19 85.7 kg (189 lb)   04/25/19 84.8 kg (187 lb)                       Reviewed and updated as needed this visit by Provider         Review of Systems   ROS COMP: Constitutional, HEENT, cardiovascular, pulmonary and gu systems are negative, except as otherwise noted.      Objective    /73 (BP Location: Right arm, Patient Position: Chair, Cuff Size: Adult Regular)   Pulse 73   Wt 80.7 kg (178 lb)   BMI 28.09 kg/m    There is no height or weight on file to calculate BMI.  Physical Exam   GENERAL: alert and moderate distress due to recent persistent discomfort  EYES: Eyes grossly normal to inspection, PERRL and conjunctivae and sclerae normal  NECK: no adenopathy, no asymmetry, masses, or scars and thyroid normal to palpation  RESP: lungs clear to auscultation - no rales, rhonchi or wheezes  CV: regular rate and rhythm, normal S1 S2, no S3 or S4, no murmur, click or rub, no  "peripheral edema and peripheral pulses strong  ABDOMEN: soft, nontender, no hepatosplenomegaly, no masses and bowel sounds normal  MS: no gross musculoskeletal defects noted, no edema  NEURO: Normal strength and tone, mentation intact and speech normal  PSYCH: mentation appears normal, affect normal/bright    Diagnostic Test Results:  Labs reviewed in Epic  Results for orders placed or performed in visit on 06/10/19 (from the past 24 hour(s))   CBC with platelets   Result Value Ref Range    WBC 3.5 (L) 4.0 - 11.0 10e9/L    RBC Count 4.48 3.8 - 5.2 10e12/L    Hemoglobin 12.5 11.7 - 15.7 g/dL    Hematocrit 37.6 35.0 - 47.0 %    MCV 84 78 - 100 fl    MCH 27.9 26.5 - 33.0 pg    MCHC 33.2 31.5 - 36.5 g/dL    RDW 11.7 10.0 - 15.0 %    Platelet Count 296 150 - 450 10e9/L     See orders.          Assessment & Plan       ICD-10-CM    1. Abdominal pain, epigastric R10.13 H Pylori antigen, stool     Hepatic panel (Albumin, ALT, AST, Bili, Alk Phos, TP)     CBC with platelets     US Abdomen Limited     omeprazole (PRILOSEC) 20 MG DR capsule     H Pylori antigen, stool      likely severe GERD symptoms.   Could have been triggered by a THC vape, discussed the spectrum of cyclic vomiting/GERD from this use.   She has not been using lately, so unsure if current symptoms could be related.     Will treat empirically with high dose PPI initially.  Check for GB stones and check LFTs.   Will check Hpylori   She can use baking soda prn as well, since it worked.     Close follow up with her which she can cancel if plan works.  If symptoms not resolving, then consider EGD with GI consult.     BMI:   Estimated body mass index is 28.09 kg/m  as calculated from the following:    Height as of 4/30/19: 1.695 m (5' 6.75\").    Weight as of this encounter: 80.7 kg (178 lb).              Patient Instructions   abd ultrasound     Omeprazole 20 mg:  Take two twice daily for a couple weeks, then reduce to 40 mg each morning, then 20 mg once " daily    Stool test    Follow up in 1 - 2 weeks.       No follow-ups on file.    Margarita Dimas MD  [facility]

## 2019-06-10 NOTE — LETTER
[facility]  [address] [place]  [address]  [phone]        June 11, 2019    Ruthy Cameron  [address]   [place] [address]        Dear Ruthy,    Your gallbladder looks normal.   I hope you are feeling better with the intensive anti acid therapy     Results for orders placed or performed in visit on 06/10/19   US Abdomen Limited    Narrative    RIGHT UPPER QUADRANT ULTRASOUND 6/10/2019 12:35 PM    HISTORY:  Abdominal pain, epigastric    COMPARISON: 1/30/2015    FINDINGS:    Gallbladder: Unremarkable with no cholelithiasis, wall thickening or  focal tenderness.      Bile ducts:   CHD is normal diameter.  No intrahepatic biliary  dilatation.    Liver:  Unremarkable    Pancreas:  Visualized portions of the pancreas are unremarkable.    Right kidney:  Unremarkable      Impression    IMPRESSION:  No definite acute abnormality.    JEF JORGE MD   If you have any questions please call the clinic at [phone].    Sincerely,    Margarita CABRALES

## 2019-06-10 NOTE — PATIENT INSTRUCTIONS
abd ultrasound     Omeprazole 20 mg:  Take two twice daily for a couple weeks, then reduce to 40 mg each morning, then 20 mg once daily    Stool test    Follow up in 1 - 2 weeks.

## 2019-06-11 ENCOUNTER — HOSPITAL ENCOUNTER (EMERGENCY)
Facility: CLINIC | Age: 39
Discharge: HOME | End: 2019-06-11
Attending: EMERGENCY MEDICINE | Admitting: EMERGENCY MEDICINE
Payer: COMMERCIAL

## 2019-06-11 ENCOUNTER — APPOINTMENT (OUTPATIENT)
Dept: GENERAL RADIOLOGY | Facility: CLINIC | Age: 39
End: 2019-06-11
Attending: EMERGENCY MEDICINE
Payer: COMMERCIAL

## 2019-06-11 VITALS
OXYGEN SATURATION: 96 % | SYSTOLIC BLOOD PRESSURE: 133 MMHG | HEIGHT: 67 IN | HEART RATE: 71 BPM | TEMPERATURE: 98.3 F | DIASTOLIC BLOOD PRESSURE: 78 MMHG | RESPIRATION RATE: 18 BRPM | BODY MASS INDEX: 27.88 KG/M2

## 2019-06-11 DIAGNOSIS — K21.9 GASTROESOPHAGEAL REFLUX DISEASE, ESOPHAGITIS PRESENCE NOT SPECIFIED: ICD-10-CM

## 2019-06-11 LAB
ALBUMIN SERPL-MCNC: 4.4 G/DL (ref 3.4–5)
ALP SERPL-CCNC: 65 U/L (ref 40–150)
ALT SERPL W P-5'-P-CCNC: 24 U/L (ref 0–50)
ANION GAP SERPL CALCULATED.3IONS-SCNC: 11 MMOL/L (ref 3–14)
AST SERPL W P-5'-P-CCNC: 14 U/L (ref 0–45)
B-HCG FREE SERPL-ACNC: <5 IU/L
BASOPHILS # BLD AUTO: 0 10E9/L (ref 0–0.2)
BASOPHILS NFR BLD AUTO: 0.6 %
BILIRUB SERPL-MCNC: 0.7 MG/DL (ref 0.2–1.3)
BUN SERPL-MCNC: 13 MG/DL (ref 7–30)
CALCIUM SERPL-MCNC: 9.5 MG/DL (ref 8.5–10.1)
CHLORIDE SERPL-SCNC: 102 MMOL/L (ref 94–109)
CO2 SERPL-SCNC: 26 MMOL/L (ref 20–32)
CREAT SERPL-MCNC: 0.89 MG/DL (ref 0.52–1.04)
D DIMER PPP FEU-MCNC: <0.3 UG/ML FEU (ref 0–0.5)
DIFFERENTIAL METHOD BLD: NORMAL
EOSINOPHIL # BLD AUTO: 0 10E9/L (ref 0–0.7)
EOSINOPHIL NFR BLD AUTO: 0.4 %
ERYTHROCYTE [DISTWIDTH] IN BLOOD BY AUTOMATED COUNT: 12.1 % (ref 10–15)
GFR SERPL CREATININE-BSD FRML MDRD: 82 ML/MIN/{1.73_M2}
GLUCOSE BLDC GLUCOMTR-MCNC: 49 MG/DL (ref 70–99)
GLUCOSE SERPL-MCNC: 61 MG/DL (ref 70–99)
H PYLORI AG STL QL IA: NORMAL
HCT VFR BLD AUTO: 38.7 % (ref 35–47)
HGB BLD-MCNC: 13 G/DL (ref 11.7–15.7)
IMM GRANULOCYTES # BLD: 0 10E9/L (ref 0–0.4)
IMM GRANULOCYTES NFR BLD: 0.2 %
INTERPRETATION ECG - MUSE: NORMAL
LIPASE SERPL-CCNC: 136 U/L (ref 73–393)
LYMPHOCYTES # BLD AUTO: 2 10E9/L (ref 0.8–5.3)
LYMPHOCYTES NFR BLD AUTO: 41.4 %
MCH RBC QN AUTO: 27.8 PG (ref 26.5–33)
MCHC RBC AUTO-ENTMCNC: 33.6 G/DL (ref 31.5–36.5)
MCV RBC AUTO: 83 FL (ref 78–100)
MONOCYTES # BLD AUTO: 0.3 10E9/L (ref 0–1.3)
MONOCYTES NFR BLD AUTO: 7 %
NEUTROPHILS # BLD AUTO: 2.5 10E9/L (ref 1.6–8.3)
NEUTROPHILS NFR BLD AUTO: 50.4 %
NRBC # BLD AUTO: 0 10*3/UL
NRBC BLD AUTO-RTO: 0 /100
PLATELET # BLD AUTO: 289 10E9/L (ref 150–450)
POTASSIUM SERPL-SCNC: 3.5 MMOL/L (ref 3.4–5.3)
PROT SERPL-MCNC: 7.9 G/DL (ref 6.8–8.8)
RBC # BLD AUTO: 4.68 10E12/L (ref 3.8–5.2)
SODIUM SERPL-SCNC: 139 MMOL/L (ref 133–144)
SPECIMEN SOURCE: NORMAL
TROPONIN I SERPL-MCNC: <0.015 UG/L (ref 0–0.04)
WBC # BLD AUTO: 4.9 10E9/L (ref 4–11)

## 2019-06-11 PROCEDURE — 80053 COMPREHEN METABOLIC PANEL: CPT | Performed by: EMERGENCY MEDICINE

## 2019-06-11 PROCEDURE — 00000146 ZZHCL STATISTIC GLUCOSE BY METER IP

## 2019-06-11 PROCEDURE — 84702 CHORIONIC GONADOTROPIN TEST: CPT

## 2019-06-11 PROCEDURE — 99285 EMERGENCY DEPT VISIT HI MDM: CPT | Mod: 25

## 2019-06-11 PROCEDURE — 25000128 H RX IP 250 OP 636: Performed by: EMERGENCY MEDICINE

## 2019-06-11 PROCEDURE — 83690 ASSAY OF LIPASE: CPT | Performed by: EMERGENCY MEDICINE

## 2019-06-11 PROCEDURE — 84484 ASSAY OF TROPONIN QUANT: CPT | Performed by: EMERGENCY MEDICINE

## 2019-06-11 PROCEDURE — 85379 FIBRIN DEGRADATION QUANT: CPT | Performed by: EMERGENCY MEDICINE

## 2019-06-11 PROCEDURE — 93005 ELECTROCARDIOGRAM TRACING: CPT

## 2019-06-11 PROCEDURE — 25000132 ZZH RX MED GY IP 250 OP 250 PS 637: Performed by: EMERGENCY MEDICINE

## 2019-06-11 PROCEDURE — 71046 X-RAY EXAM CHEST 2 VIEWS: CPT

## 2019-06-11 PROCEDURE — 85025 COMPLETE CBC W/AUTO DIFF WBC: CPT | Performed by: EMERGENCY MEDICINE

## 2019-06-11 RX ORDER — SODIUM CHLORIDE 9 MG/ML
1000 INJECTION, SOLUTION INTRAVENOUS CONTINUOUS
Status: DISCONTINUED | OUTPATIENT
Start: 2019-06-11 | End: 2019-06-11 | Stop reason: HOSPADM

## 2019-06-11 RX ORDER — ALUMINA, MAGNESIA, AND SIMETHICONE 2400; 2400; 240 MG/30ML; MG/30ML; MG/30ML
15 SUSPENSION ORAL ONCE
Status: COMPLETED | OUTPATIENT
Start: 2019-06-11 | End: 2019-06-11

## 2019-06-11 RX ADMIN — SODIUM CHLORIDE 1000 ML: 9 INJECTION, SOLUTION INTRAVENOUS at 18:51

## 2019-06-11 RX ADMIN — ALUMINUM HYDROXIDE, MAGNESIUM HYDROXIDE, AND DIMETHICONE 15 ML: 400; 400; 40 SUSPENSION ORAL at 17:26

## 2019-06-11 ASSESSMENT — ENCOUNTER SYMPTOMS
BLOOD IN STOOL: 0
FEVER: 0
NAUSEA: 0
VOMITING: 0
CHILLS: 0
SHORTNESS OF BREATH: 0
ABDOMINAL PAIN: 1

## 2019-06-11 NOTE — ED TRIAGE NOTES
Pt reports severe heartburn and chest pressure. Pt also reports pain in chest is worse with inspiration. Pt reports she went to her PCP yesterday and was given an Rx for omeprazole. Pt reports she has taken 3 doses today with no relief. Denies any other medication for GERD.

## 2019-06-11 NOTE — ED PROVIDER NOTES
History     Chief Complaint:  Chest Pain and Gastroesophageal Reflux    HPI   Ruthy Cameron is a 38 year old female with a history of gastroesophageal reflux disease who presents with chest pressure and reflux symptoms. On Saturday, 3 days ago, the patient states she first began feeling increased pressure in her chest, throat, and epigastrium consistent with her previous reflux. The patient notes she started taking her Omeprazole again yesterday per recommendation by her primary care physician, but she has not had any relief from this, ultimately prompting her to come to the ED for further evaluation. Today, she reports her pain has been constant and worse than the past few days. She states she is afraid to eat secondary to concern for increased pain, but she otherwise denies lower abdominal pain, nausea, vomiting, black or bloody stools, shortness of breath, or other acute symptoms. The patient notes she has not had symptoms this severe since her original diagnosis about one year ago.    Allergies:  No known drug allergies    Medications:    Omeprazole  Mylanta/maalox    Past Medical History:    Gastroesophageal reflux disease  Cervical intraepithelial neoplasia II    Past Surgical History:    History reviewed. No pertinent surgical history.  The patient denies any prior abdominal surgeries.    Family History:    Brain aneurysm  Hypertension    Social History:  Smoking status: Former smoker, quit 2018  Alcohol use: Yes, monthly  Drug use: Yes, marijuana  PCP: [facility]  Marital Status: Single [1]     Review of Systems   Constitutional: Negative for chills and fever.   Respiratory: Negative for shortness of breath.    Cardiovascular: Positive for chest pain.   Gastrointestinal: Positive for abdominal pain. Negative for blood in stool, nausea and vomiting.   All other systems reviewed and are negative.    Physical Exam     Patient Vitals for the past 24 hrs:   BP Temp Temp src Pulse Resp SpO2 Height  "  06/11/19 1851 -- -- -- -- -- 100 % --   06/11/19 1848 140/85 -- -- 72 -- 100 % --   06/11/19 1652 138/69 98.3  F (36.8  C) Temporal 71 18 100 % 1.702 m (5' 7\")     Physical Exam  Nursing note and vitals reviewed.  Constitutional:  Oriented to person, place, and time. Cooperative.   HENT:   Nose:    Nose normal.   Mouth/Throat:   Mucous membranes are normal.   Eyes:    Conjunctivae normal and EOM are normal.      Pupils are equal, round, and reactive to light.   Neck:    Trachea normal.   Cardiovascular:  Normal rate, regular rhythm, normal heart sounds and normal pulses. No murmur heard.  Pulmonary/Chest:  Effort normal and breath sounds normal.   Abdominal:   Soft. Normal appearance and bowel sounds are normal.      No reproducible tenderness to palpation at this time.     There is no rebound and no CVA tenderness.   Musculoskeletal:  Extremities atraumatic x 4.   Lymphadenopathy:  No cervical adenopathy.   Neurological:   Alert and oriented to person, place, and time. Normal strength.      No cranial nerve deficit or sensory deficit. GCS eye subscore is 4. GCS verbal subscore is 5. GCS motor subscore is 6.   Skin:    Skin is intact. No rash noted.   Psychiatric:   Normal mood and affect    Emergency Department Course   ECG (16:56:45):  Rate 79 bpm. MO interval 134. QRS duration 82. QT/QTc 392/449. P-R-T axes 66 65 69. Normal sinus rhythm with sinus arrhythmia. Normal ECG. Interpreted at 1727 by John Ricketts MD.    Imaging:  Radiographic findings were communicated with the patient who voiced understanding of the findings.    XR Chest 2 views:  No acute disease.  As read by Radiology.    Laboratory:  CBC: WNL (WBC 4.9, HGB 13.0, )  CMP (1842): Glucose 61 (L), o/w WNL (Creatinine 0.89)  Glucose (1918): 49 (LL)  Lipase: 136  Troponin (1803): <0.015  ISTAT hCG quant: <5.0    Interventions:  1726: 15 mL Mylanta/Maalox PO  1851: NS 1L IV Bolus    Emergency Department Course:  Past medical records, nursing " notes, and vitals reviewed.  1731: I performed an exam of the patient and obtained history, as documented above.  ECG performed, results above.  IV inserted and blood drawn.  The patient was sent for a chest x-ray while in the emergency department, findings above.    1911: I rechecked the patient. Findings and plan explained to the patient. She is going to take a dose of her home Omeprazole. She is tolerating PO here. Patient discharged home with instructions regarding supportive care, medications, and reasons to return. The importance of close follow-up was reviewed.     Impression & Plan    Medical Decision Making:  Ruthy Cameron is a 38 year old female who presents to the ED for evaluation of chest and abdominal pain consistent with her previous GERD diagnosis.  Despite that, I felt it was reasonable and appropriate to proceed with the above work-up to see if there might be something else causing her symptoms.  Her work-up here is unremarkable, and she feels that the Maalox she was provided has helped.  I think at this point she is safe for discharge, but she does need close outpatient follow-up with GI.  I am providing her the contact information for Dr. Johnston, and I instructed her to call his office tomorrow.  I instructed her to return here with any concerns or worsening symptoms as well.  She should also follow-up with her physician.    Diagnosis:    ICD-10-CM   1. Gastroesophageal reflux disease, esophagitis presence not specified K21.9     Disposition: Discharged to home    Discharge Medications: None    Yovana Johnson  6/11/2019    EMERGENCY DEPARTMENT    I, Yovana Alex, am serving as a scribe at 5:31 PM on 6/11/2019 to document services personally performed by John Ricketts MD based on my observations and the provider's statements to me.      John Ricketts MD  06/12/19 0128

## 2019-06-11 NOTE — ED AVS SNAPSHOT
Emergency Department  [address] [place] [address]  Phone:  [phone]  Fax:  [phone]                                    Ruthy Cameron   MRN: [id number]    Department:   Emergency Department   Date of Visit:  6/11/2019           After Visit Summary Signature Page    I have received my discharge instructions, and my questions have been answered. I have discussed any challenges I see with this plan with the nurse or doctor.    ..........................................................................................................................................  Patient/Patient Representative Signature      ..........................................................................................................................................  Patient Representative Print Name and Relationship to Patient    ..................................................               ................................................  Date                                   Time    ..........................................................................................................................................  Reviewed by Signature/Title    ...................................................              ..............................................  Date                                               Time          22EPIC Rev 08/18

## 2019-06-11 NOTE — RESULT ENCOUNTER NOTE
Ruthy Cameron    Your gallbladder looks normal.   I hope you are feeling better with the intensive anti acid therapy     Sincerely,     REY MARTINO M.D.

## 2019-06-17 ENCOUNTER — TRANSFERRED RECORDS (OUTPATIENT)
Dept: HEALTH INFORMATION MANAGEMENT | Facility: CLINIC | Age: 39
End: 2019-06-17

## 2019-09-10 ENCOUNTER — TELEPHONE (OUTPATIENT)
Dept: FAMILY MEDICINE | Facility: CLINIC | Age: 39
End: 2019-09-10

## 2019-09-10 NOTE — TELEPHONE ENCOUNTER
Spoke with pt, needs to call us back since she is at work.    Pt is erroneously DB with JS at 4:40. Can she come in at 6:20 today instead? (6:20 on hold for 4:40 DB) Other patient at 4:40 is a pre-op.    Will wait for patient's call.    Valorie Mejia RN

## 2019-09-10 NOTE — TELEPHONE ENCOUNTER
Patient scheduled for next week    Next 5 appointments (look out 90 days)    Sep 17, 2019  5:00 PM CDT  SHORT with Jamison Enciso PA-C  [facility] [address] [facility] [address]  [phone]        Bhargavi MONTES RN

## 2019-09-10 NOTE — TELEPHONE ENCOUNTER
LVM with pt checking to see if patient can come at a different time.    Please call back as soon as possible.    Valorie Mejia RN

## 2019-09-17 ENCOUNTER — OFFICE VISIT (OUTPATIENT)
Dept: FAMILY MEDICINE | Facility: CLINIC | Age: 39
End: 2019-09-17
Payer: MEDICAID

## 2019-09-17 VITALS
OXYGEN SATURATION: 100 % | HEART RATE: 88 BPM | DIASTOLIC BLOOD PRESSURE: 75 MMHG | SYSTOLIC BLOOD PRESSURE: 137 MMHG | RESPIRATION RATE: 16 BRPM | BODY MASS INDEX: 28.61 KG/M2 | WEIGHT: 182.3 LBS | HEIGHT: 67 IN | TEMPERATURE: 98.6 F

## 2019-09-17 DIAGNOSIS — L98.9 SKIN LESION: ICD-10-CM

## 2019-09-17 DIAGNOSIS — L60.0 INGROWN TOENAIL: Primary | ICD-10-CM

## 2019-09-17 PROCEDURE — 99213 OFFICE O/P EST LOW 20 MIN: CPT | Performed by: PHYSICIAN ASSISTANT

## 2019-09-17 ASSESSMENT — MIFFLIN-ST. JEOR: SCORE: 1534.54

## 2019-09-17 NOTE — PROGRESS NOTES
"Subjective     Ruthy Cameron is a 39 year old female who presents to clinic today for the following health issues:    HPI   Chief Complaint   Patient presents with     Ingrown Toenail     Left Great Toe     Patient would like to get info on what to do next for her toe pain-    She has had issues with ingrown toenails in the past.  This has been bothering her for the last few weeks.  Not much for treatment.  She has worked with podiatry in the past.    She is also interested in getting a dermatology referral.            BP Readings from Last 3 Encounters:   09/24/19 126/83   09/17/19 137/75   06/11/19 133/78    Wt Readings from Last 3 Encounters:   09/24/19 82.4 kg (181 lb 9.6 oz)   09/17/19 82.7 kg (182 lb 4.8 oz)   06/10/19 80.7 kg (178 lb)                      Reviewed and updated as needed this visit by Provider         Review of Systems   ROS COMP: Constitutional, HEENT, cardiovascular, pulmonary, gi and gu systems are negative, except as otherwise noted.      Objective    /75   Pulse 88   Temp 98.6  F (37  C) (Oral)   Resp 16   Ht 1.702 m (5' 7\")   Wt 82.7 kg (182 lb 4.8 oz)   SpO2 100%   BMI 28.55 kg/m    Body mass index is 28.55 kg/m .  Physical Exam   GENERAL: alert and no distress  EYES: Eyes grossly normal to inspection  RESP: lungs clear to auscultation - no rales, rhonchi or wheezes  CV: regular rate and rhythm, normal S1 S2, no S3 or S4, no murmur, click or rub, no peripheral edema and peripheral pulses strong  SKIN: does appear to have in grown great toenail left side.  Tender.  No fluctuant mass or signs of paronychia.      Diagnostic Test Results:  Labs reviewed in Epic        Assessment & Plan     1. Ingrown toenail  Will get her into podiatry for probably removal.  - PODIATRY/FOOT & ANKLE SURGERY REFERRAL    2. Skin lesion    - DERMATOLOGY REFERRAL     BMI:   Estimated body mass index is 28.55 kg/m  as calculated from the following:    Height as of this encounter: 1.702 m (5' 7\").    " Weight as of this encounter: 82.7 kg (182 lb 4.8 oz).               No follow-ups on file.    Jamison Enciso PA-C  [facility]

## 2019-09-24 ENCOUNTER — OFFICE VISIT (OUTPATIENT)
Dept: PODIATRY | Facility: CLINIC | Age: 39
End: 2019-09-24
Payer: MEDICAID

## 2019-09-24 VITALS
HEIGHT: 66 IN | DIASTOLIC BLOOD PRESSURE: 83 MMHG | WEIGHT: 181.6 LBS | BODY MASS INDEX: 29.18 KG/M2 | SYSTOLIC BLOOD PRESSURE: 126 MMHG | HEART RATE: 76 BPM

## 2019-09-24 DIAGNOSIS — L60.0 INGROWING NAIL: Primary | ICD-10-CM

## 2019-09-24 PROCEDURE — 11750 EXCISION NAIL&NAIL MATRIX: CPT | Mod: T5 | Performed by: PODIATRIST

## 2019-09-24 RX ORDER — IBUPROFEN 600 MG/1
600 TABLET, FILM COATED ORAL EVERY 6 HOURS PRN
Qty: 40 TABLET | Refills: 1 | Status: SHIPPED | OUTPATIENT
Start: 2019-09-24 | End: 2020-08-13

## 2019-09-24 ASSESSMENT — MIFFLIN-ST. JEOR: SCORE: 1515.48

## 2019-09-24 NOTE — LETTER
"    9/24/2019         RE: Ruthy Cameron  [address]  [place] [address]        Dear Colleague,    Thank you for referring your patient, Ruthy Cameron, to the [facility]. Please see a copy of my visit note below.    PATIENT HISTORY:  Ruthy Cameron is a 39 year old female who presents to clinic for recurrence of R 1st toenail pain.  Going on for a few months.  Hx of prior nonpermanent partial removal for ingrown nail.  7/10 pain.  No fevers, chills, limping.  Nonsmoker.  Pt is working.  Nondiabetic.  No related family hx.     EXAM:Vitals: /83   Pulse 76   Ht 1.676 m (5' 6\")   Wt 82.4 kg (181 lb 9.6 oz)   BMI 29.31 kg/m     BMI= Body mass index is 29.31 kg/m .    General appearance: Patient is alert and fully cooperative with history & exam.  No sign of distress is noted during the visit.     Dermatologic: R hallux nail ingrown along lateral border.  Pain with pressure.  Dystrophic nail.   No evidence of soft tissue infection is noted.     Vascular: DP & PT pulses are intact & regular on the R.  No significant edema or varicosities noted.  CFT and skin temperature are normal.     Neurologic: Lower extremity sensation is intact to light touch.  No evidence of weakness or contracture in the lower extremities.  No evidence of neuropathy.     Musculoskeletal: Patient is ambulatory without assistive device or brace.  No gross ankle deformity noted.  No foot or ankle joint effusion is noted.     ASSESSMENT: R 1st toe ingrowing nail     PLAN:  Reviewed patient's chart in epic.  Discussed condition and treatment options including pros and cons.    The potential causes and nature of an ingrown toenail were discussed with the patient.  We reviewed the natural history/prognosis of the condition and potential risks if no treatment is provided.      Treatment options discussed included conservative management (soaking of foot, adequate width shoes)  as well as surgical management " (partial or total nail removal).  The pros and cons of both forms of treatment were reviewed.      After thorough discussion and answering all questions, the patient elected to proceed with permanent partial nail avulsion.  Discussed risk of infection, recurrence.     Procedure:  In addition to office visit.  After consent, the R 1st toe was anesthetized with 5cc's of 1% lidocaine plain after alcohol prep. Betadine prep performed.  A tourniquet was applied to the toe. Using sterile instrumentation, the lateral border was then raised from the nail bed and then cut the length of the nail.  The offending nail border was then removed.  Three 30 second applications of phenol were applied to the nail bed and nail matrix.  The area was then flushed with copious amounts of alcohol.  Bacitracin was applied to the nail bed.  The tourniquet was removed and a hyperemic response was noted.  Bandage was applied to the toe.  The patient tolerated the procedure and anesthesia well.    Post op instructions provided and discussed with pt.  F/u in 2 wks.    Ibuprofen prescribed for pain at pt's request.      Gilberto Chamberlain DPM, FACFAS    Weight management plan: Patient was referred to their PCP to discuss a diet and exercise plan.        Again, thank you for allowing me to participate in the care of your patient.        Sincerely,        Gilberto Chamberlain DPM

## 2019-09-24 NOTE — PROGRESS NOTES
"PATIENT HISTORY:  Ruthy Cameron is a 39 year old female who presents to clinic for recurrence of R 1st toenail pain.  Going on for a few months.  Hx of prior nonpermanent partial removal for ingrown nail.  7/10 pain.  No fevers, chills, limping.  Nonsmoker.  Pt is working.  Nondiabetic.  No related family hx.     EXAM:Vitals: /83   Pulse 76   Ht 1.676 m (5' 6\")   Wt 82.4 kg (181 lb 9.6 oz)   BMI 29.31 kg/m    BMI= Body mass index is 29.31 kg/m .    General appearance: Patient is alert and fully cooperative with history & exam.  No sign of distress is noted during the visit.     Dermatologic: R hallux nail ingrown along lateral border.  Pain with pressure.  Dystrophic nail.   No evidence of soft tissue infection is noted.     Vascular: DP & PT pulses are intact & regular on the R.  No significant edema or varicosities noted.  CFT and skin temperature are normal.     Neurologic: Lower extremity sensation is intact to light touch.  No evidence of weakness or contracture in the lower extremities.  No evidence of neuropathy.     Musculoskeletal: Patient is ambulatory without assistive device or brace.  No gross ankle deformity noted.  No foot or ankle joint effusion is noted.     ASSESSMENT: R 1st toe ingrowing nail     PLAN:  Reviewed patient's chart in epic.  Discussed condition and treatment options including pros and cons.    The potential causes and nature of an ingrown toenail were discussed with the patient.  We reviewed the natural history/prognosis of the condition and potential risks if no treatment is provided.      Treatment options discussed included conservative management (soaking of foot, adequate width shoes)  as well as surgical management (partial or total nail removal).  The pros and cons of both forms of treatment were reviewed.      After thorough discussion and answering all questions, the patient elected to proceed with permanent partial nail avulsion.  Discussed risk of infection, " recurrence.     Procedure:  In addition to office visit.  After consent, the R 1st toe was anesthetized with 5cc's of 1% lidocaine plain after alcohol prep. Betadine prep performed.  A tourniquet was applied to the toe. Using sterile instrumentation, the lateral border was then raised from the nail bed and then cut the length of the nail.  The offending nail border was then removed.  Three 30 second applications of phenol were applied to the nail bed and nail matrix.  The area was then flushed with copious amounts of alcohol.  Bacitracin was applied to the nail bed.  The tourniquet was removed and a hyperemic response was noted.  Bandage was applied to the toe.  The patient tolerated the procedure and anesthesia well.    Post op instructions provided and discussed with pt.  F/u in 2 wks.    Ibuprofen prescribed for pain at pt's request.      Gilberto Chamberlain DPM, FACFAS    Weight management plan: Patient was referred to their PCP to discuss a diet and exercise plan.

## 2019-09-24 NOTE — PATIENT INSTRUCTIONS
Thank you for choosing [facility]!    Follow up in 2 weeks    DR. DENT'S CLINIC LOCATIONS     MONDAY  [place] TUESDAY & FRIDAY AM  [place]   [facility]   [address]   [place] [address] [place] [address]   [phone]  [phone]  [phone]       WEDNESDAY  [place] SCHEDULE SURGERY: [phone]   [address] [place] [address] APPOINTMENTS: [phone]   [place] [address] BILLING QUESTIONS: [phone]   [phone]         INGROWN TOENAIL  When a toenail is ingrown, it is curved and grows into the skin, usually at the nail borders (the sides of the nail). This  digging in  of the nail irritates the skin, often creating pain, redness, swelling, and warmth in the toe.  If an ingrown nail causes a break in the skin, bacteria may enter and cause an infection in the area, which is often marked by drainage and a foul odor. However, even if the toe isn t painful, red, swollen, or warm, a nail that curves downward into the skin can progress to an infection.  CAUSES  Causes of ingrown toenails include:    Heredity. In many people, the tendency for ingrown toenails is inherited.     Trauma. Sometimes an ingrown toenail is the result of trauma, such as stubbing your toe, having an object fall on your toe, or engaging in activities that involve repeated pressure on the toes, such as kicking or running.     Improper trimming. The most common cause of ingrown toenails is cutting your nails too short. This encourages the skin next to the nail to fold over the nail.     Improperly sized footwear. Ingrown toenails can result from wearing socks and shoes that are tight or short.     Nail Conditions. Ingrown toenails can be caused by nail problems, such as fungal infections or losing a nail due to trauma.   TREATMENT  Sometimes initial treatment for ingrown toenails can be safely performed at home. However, home treatment is  strongly discouraged if an infection is suspected, or for those who have medical conditions that put feet at high risk, such as diabetes, nerve damage in the foot, or poor circulation.  Home care:  If you don t have an infection or any of the above medical conditions, you can soak your foot in room-temperature water (adding Epsom s salt may be recommended by your doctor), and gently massage the side of the nail fold to help reduce the inflammation.  Avoid attempting  bathroom surgery.  Repeated cutting of the nail can cause the condition to worsen over time. If your symptoms fail to improve, it s time to see a foot and ankle surgeon.  Physician care:  After examining the toe, the foot and ankle surgeon will select the treatment best suited for you. If an infection is present, an oral antibiotic may be prescribed.  Sometimes a minor surgical procedure, often performed in the office, will ease the pain and remove the offending nail. After applying a local anesthetic, the doctor removes part of the nail s side border. Some nails may become ingrown again, requiring removal of the nail root.  Following the nail procedure, a light bandage will be applied. Most people experience very little pain after surgery and may resume normal activity the next day. If your surgeon has prescribed an oral antibiotic, be sure to take all the medication, even if your symptoms have improved.    PREVENTION  Many cases of ingrown toenails may be prevented by:    Proper trimming. Cut toenails in a fairly straight line, and don t cut them too short. You should be able to get your fingernail under the sides and end of the nail.     Well-fitted shoes and socks. Don t wear shoes that are short or tight in the toe area. Avoid shoes that are loose, because they too cause pressure on the toes, especially when running or walking briskly.     INGROWN TOENAIL POSTOPERATIVE INSTRUCTIONS   (Nail avulsion or chemical matrixectomy)   Go directly home and  elevate the affected foot on one or two pillows for the remainder of the day/evening. Your toe may stay numb for the next 2-8 hours.   Take Tylenol, ibuprofen or another anti-inflammatory as needed for pain.   Take antibiotic if that has been prescribed. Take the entire prescribed antibiotic even if your symptoms have improved.   Keep dressing dry and intact the day of the procedure. The morning after the procedure, remove entire dressing and soak/wash the affected area in warm water (you may add Epsom salt) for 5 to 10 minutes. Do this twice a day for 2-4 weeks (6-8 weeks if you had phenol) (you may count showering/bathing as one soak).  After soaks, pat the area dry and then allow to airdry for a few minutes. Apply antibiotic ointment to the area and cover with 2 X 2 gauze and paper tape or a band-aid.  May walk and pursue everyday activities as tolerated with either an open toe shoe or cut-out shoe as needed. May wear regular shoes if no pain is noted.  Watch for any signs and symptoms of infection such as: redness, red streaks going up the foot/leg, swelling, pus or foul odor. Those that have had the phenol procedure, the toe will drain longer and will look like it is infected because it is a chemical burn.  Please call with questions.        BODY WEIGHT AND YOUR FEET  The following information is included in the after visit summary for all patients. Body weight can be a sensitive issue to discuss in clinic, but we think the following information is very important. Although we focus on the feet and ankles, we do support the overall health of our patients.     Many things can cause foot and ankle problems. Foot structure, activity level, foot mechanics and injuries are common causes of pain. One very important issue that often goes unmentioned, is body weight. Extra weight can cause increased stress on muscles, ligaments, bones and tendons. Sometimes just a few extra pounds is all it takes to put one over her/his  threshold. Without reducing that stress, it can be difficult to alleviate pain. As Foot & Ankle specialists, our job is addressing the lower extremity problem and possible causes. Regarding extra body weight, we encourage patients to discuss diet and weight management plans with their primary care doctors. It is this team approach that gives you the best opportunity for pain relief and getting you back on your feet.      [facility] has a Comprehensive Weight Management Program. This program includes counseling, education, non-surgical and surgical approaches to weight loss. If you are interested in learning more either talk to you primary care provider or call [phone].

## 2019-10-31 ENCOUNTER — TELEPHONE (OUTPATIENT)
Dept: FAMILY MEDICINE | Facility: CLINIC | Age: 39
End: 2019-10-31

## 2019-10-31 ENCOUNTER — OFFICE VISIT (OUTPATIENT)
Dept: PODIATRY | Facility: CLINIC | Age: 39
End: 2019-10-31

## 2019-10-31 VITALS
SYSTOLIC BLOOD PRESSURE: 118 MMHG | DIASTOLIC BLOOD PRESSURE: 64 MMHG | BODY MASS INDEX: 29.09 KG/M2 | HEIGHT: 66 IN | WEIGHT: 181 LBS

## 2019-10-31 DIAGNOSIS — L60.0 ONYCHOCRYPTOSIS: Primary | ICD-10-CM

## 2019-10-31 PROCEDURE — 99212 OFFICE O/P EST SF 10 MIN: CPT | Performed by: PODIATRIST

## 2019-10-31 ASSESSMENT — MIFFLIN-ST. JEOR: SCORE: 1512.76

## 2019-10-31 NOTE — PROGRESS NOTES
"Foot & Ankle Surgery   October 31, 2019    S:  Pt is seen today for evaluation of lateral R hallux, approx 4 weeks after partial permanent avulsion by Dr Chamberlain.  She was doing twice-daily soaks and using A+D ointment.  She indicates it's not draining and she's not having any pain.     Vitals:    10/31/19 1524   BP: 118/64   Weight: 82.1 kg (181 lb)   Height: 1.676 m (5' 6\")   '      ROS - Pos for CC.  Patient denies current nausea, vomiting, chills, fevers, belly pain, calf pain, chest pain or SOB.  Complete remainder of ROS it otherwise neg.      PE:  Gen:   No apparent distress  Eye:    Visual scanning without deficit  Ear:    Response to auditory stimuli wnl  Lung:    Non-labored breathing on RA noted  Abd:    NTND per patient report  Lymph:    Neg for pitting/non-pitting edema BLE  Vasc:    Pulses palpable, CFT minimally delayed  Neuro:    Light touch sensation intact to all sensory nerve distributions without paresthesias  Derm:    Slight callus/scab along lateral R hallux, but no inflammation, no open wound, no drainage and no pain per patient.  MSK:    ROM, strength wnl without limitation, no pain on palpation noted.  Calf:    Neg for redness, swelling or tenderness    Assessment:  39 year old female with healed lateral R hallux P&A site      Plan:  Discussed etiologies, anatomy and options  1.  Healed lateral R hallux P&A site  -expect the callus/scab to flake off once this is fully healed.  -continue post-procedure instructions until inflammation, drainage and pain have resolved.  She indicates these are no longer present, so no further cares or follow up indicated.    -call with questions    Follow up:  prn or sooner with acute issues      Body mass index is 29.21 kg/m .  Weight management plan: Patient was referred to their PCP to discuss a diet and exercise plan.         Tray Santa DPM FACFAS FACFAOM  Podiatric Foot & Ankle Surgeon  [facility]  [phone]    "

## 2019-10-31 NOTE — PATIENT INSTRUCTIONS
Thank you for choosing [facility] / Foot & Ankle Surgery!    DR. WOODY'S CLINIC LOCATIONS:   MONDAY - [place] TUESDAY - [place]   [address] [facility]  [address]   [place] [address] [place] [address]   [phone]       THURSDAY AM - [place] THURSDAY PM - [place]   [address]   [place] [address] [place] [address]   [phone]       FRIDAY AM - [place] SET UP SURGERY: [phone] [address] APPOINTMENTS: [phone]   [place] [address] BILLING QUESTIONS: [phone] FAX NUMBER: [phone]     Follow Up:  As needed      FYI: The following information is included in the after visit summary for all patients:  Body weight can be a sensitive issue to discuss in clinic, but we think the following information is very important. Although we focus on the feet and ankles, we do support the overall health of our patients. Many things can cause foot and ankle problems. Foot structure, activity level, foot mechanics and injuries are common causes of pain. One very important issue that often goes unmentioned, is body weight. Extra weight can cause increased stress on muscles, ligaments, bones and tendons. Sometimes just a few extra pounds is all it takes to put one over her/his threshold. Without reducing that stress, it can be difficult to alleviate pain. As Foot & Ankle specialists, our job is addressing the lower extremity problem and possible causes. Regarding extra body weight, we encourage patients to discuss diet and weight management plans with their primary care doctors. It is this team approach that gives you the best opportunity for pain relief and getting you back on your feet. [facility] has a Comprehensive Weight Management Program. This program includes counseling, education, non-surgical and surgical approaches to weight loss. If you are interested in learning more either talk to you  primary care provider or call [phone].

## 2019-10-31 NOTE — TELEPHONE ENCOUNTER
Patient is scheduled to see JS today for f/u ingrown toenail    Next 5 appointments (look out 90 days)    Oct 31, 2019  3:40 PM CDT  Office Visit with Jamison Enciso PA-C  [facility] ([facility]) [address]  [facility] [address]  [phone]        Ingrown toenail addressed by podiatry 9/24/2019  Podiatry coming here to clinic today   They have openings  Pt should f/u with podiatry instead    Tried to call pt - VM full   Called pt for 2nd time - she answered and got upset, states she's at work and I need to leave her a message  Can't leave a message though     Will determine who pt to see when she gets here - podiatry will keep their [phone] on hold for pt if she'd like  Bhargavi MONTES RN

## 2019-11-15 ENCOUNTER — TRANSFERRED RECORDS (OUTPATIENT)
Dept: HEALTH INFORMATION MANAGEMENT | Facility: CLINIC | Age: 39
End: 2019-11-15

## 2020-08-13 ENCOUNTER — OFFICE VISIT (OUTPATIENT)
Dept: FAMILY MEDICINE | Facility: CLINIC | Age: 40
End: 2020-08-13
Payer: COMMERCIAL

## 2020-08-13 VITALS
WEIGHT: 170 LBS | BODY MASS INDEX: 27.32 KG/M2 | TEMPERATURE: 97.3 F | HEART RATE: 87 BPM | DIASTOLIC BLOOD PRESSURE: 88 MMHG | HEIGHT: 66 IN | SYSTOLIC BLOOD PRESSURE: 135 MMHG

## 2020-08-13 DIAGNOSIS — N76.0 VAGINITIS AND VULVOVAGINITIS: Primary | ICD-10-CM

## 2020-08-13 DIAGNOSIS — N76.0 BACTERIAL VAGINITIS: ICD-10-CM

## 2020-08-13 DIAGNOSIS — Z11.3 SCREEN FOR STD (SEXUALLY TRANSMITTED DISEASE): ICD-10-CM

## 2020-08-13 DIAGNOSIS — B96.89 BACTERIAL VAGINITIS: ICD-10-CM

## 2020-08-13 DIAGNOSIS — B97.7 HIGH RISK HPV INFECTION: ICD-10-CM

## 2020-08-13 DIAGNOSIS — I10 HTN, GOAL BELOW 140/90: ICD-10-CM

## 2020-08-13 LAB
ALBUMIN UR-MCNC: NEGATIVE MG/DL
APPEARANCE UR: CLEAR
BILIRUB UR QL STRIP: NEGATIVE
COLOR UR AUTO: YELLOW
GLUCOSE UR STRIP-MCNC: NEGATIVE MG/DL
HGB UR QL STRIP: NEGATIVE
KETONES UR STRIP-MCNC: NEGATIVE MG/DL
LEUKOCYTE ESTERASE UR QL STRIP: NEGATIVE
NITRATE UR QL: NEGATIVE
PH UR STRIP: 6 PH (ref 5–7)
SOURCE: NORMAL
SP GR UR STRIP: 1.02 (ref 1–1.03)
SPECIMEN SOURCE: ABNORMAL
UROBILINOGEN UR STRIP-ACNC: 0.2 EU/DL (ref 0.2–1)
WET PREP SPEC: ABNORMAL
WET PREP SPEC: ABNORMAL

## 2020-08-13 PROCEDURE — 99214 OFFICE O/P EST MOD 30 MIN: CPT | Performed by: INTERNAL MEDICINE

## 2020-08-13 PROCEDURE — 81003 URINALYSIS AUTO W/O SCOPE: CPT | Performed by: INTERNAL MEDICINE

## 2020-08-13 PROCEDURE — 87491 CHLMYD TRACH DNA AMP PROBE: CPT | Performed by: INTERNAL MEDICINE

## 2020-08-13 PROCEDURE — 87591 N.GONORRHOEAE DNA AMP PROB: CPT | Performed by: INTERNAL MEDICINE

## 2020-08-13 PROCEDURE — 87210 SMEAR WET MOUNT SALINE/INK: CPT | Performed by: INTERNAL MEDICINE

## 2020-08-13 RX ORDER — METRONIDAZOLE 500 MG/1
500 TABLET ORAL 2 TIMES DAILY
Qty: 14 TABLET | Refills: 0 | Status: SHIPPED | OUTPATIENT
Start: 2020-08-13 | End: 2020-08-20

## 2020-08-13 ASSESSMENT — MIFFLIN-ST. JEOR: SCORE: 1457.86

## 2020-08-13 NOTE — PROGRESS NOTES
"Subjective     Ruthy Cameron is a 40 year old female who presents to clinic today for the following health issues:    HPI       Vaginal Symptoms      Duration: 2 days     Description  vaginal discharge - and odor    Intensity:  moderate    Accompanying signs and symptoms (fever/dysuria/abdominal or back pain): None    History  Sexually active: yes, multiple partners, contraception - condoms  Possibility of pregnancy: No  Recent antibiotic use: no     Precipitating or alleviating factors: None    Therapies tried and outcome: none   Outcome:         Current Outpatient Medications   Medication Sig Dispense Refill     metroNIDAZOLE (FLAGYL) 500 MG tablet Take 1 tablet (500 mg) by mouth 2 times daily for 7 days 14 tablet 0     No Known Allergies    Reviewed and updated as needed this visit by Provider         Review of Systems   Constitutional, HEENT, cardiovascular, pulmonary, gi and gu systems are negative, except as otherwise noted.      Objective    /88 (BP Location: Right arm, Patient Position: Sitting, Cuff Size: Adult Regular)   Pulse 87   Temp 97.3  F (36.3  C) (Temporal)   Ht 1.676 m (5' 6\")   Wt 77.1 kg (170 lb)   BMI 27.44 kg/m    Body mass index is 27.44 kg/m .  Physical Exam   GENERAL: healthy, alert and no distress  NECK: no adenopathy, no asymmetry, masses, or scars and thyroid normal to palpation  RESP: lungs clear to auscultation - no rales, rhonchi or wheezes  CV: regular rate and rhythm, normal S1 S2, no S3 or S4, no murmur, click or rub, no peripheral edema and peripheral pulses strong  ABDOMEN: soft, nontender, no hepatosplenomegaly, no masses and bowel sounds normal   Tritus unremarkable except for scant discharge, sample of discharge sent to the lab for analysis cervix seen uterus nontender and small no adnexal masses or tenderness, bimanual refused  MS: no gross musculoskeletal defects noted, no edema            Assessment & Plan     1. Vaginitis and vulvovaginitis    - NEISSERIA " "GONORRHOEA PCR  - CHLAMYDIA TRACHOMATIS PCR  - UA reflex to Microscopic and Culture  - Wet prep  - metroNIDAZOLE (FLAGYL) 500 MG tablet; Take 1 tablet (500 mg) by mouth 2 times daily for 7 days  Dispense: 14 tablet; Refill: 0    2. HTN, goal below 140/90    Well-controlled with current therapy  3. High risk HPV infection      4. Bacterial vaginitis  Most likely related to Gardnerella and will treat with Flagyl twice daily x7 days    5. Screen for STD (sexually transmitted disease)         BMI:   Estimated body mass index is 27.44 kg/m  as calculated from the following:    Height as of this encounter: 1.676 m (5' 6\").    Weight as of this encounter: 77.1 kg (170 lb).               No follow-ups on file.    Brady Sanchez MD  [facility]    "

## 2020-11-05 ENCOUNTER — OFFICE VISIT (OUTPATIENT)
Dept: FAMILY MEDICINE | Facility: CLINIC | Age: 40
End: 2020-11-05
Payer: COMMERCIAL

## 2020-11-05 VITALS
HEIGHT: 66 IN | TEMPERATURE: 98.5 F | WEIGHT: 167.6 LBS | DIASTOLIC BLOOD PRESSURE: 86 MMHG | RESPIRATION RATE: 16 BRPM | HEART RATE: 76 BPM | BODY MASS INDEX: 26.93 KG/M2 | SYSTOLIC BLOOD PRESSURE: 136 MMHG | OXYGEN SATURATION: 98 %

## 2020-11-05 DIAGNOSIS — N76.0 VAGINITIS AND VULVOVAGINITIS: Primary | ICD-10-CM

## 2020-11-05 LAB
SPECIMEN SOURCE: ABNORMAL
WET PREP SPEC: ABNORMAL

## 2020-11-05 PROCEDURE — 99213 OFFICE O/P EST LOW 20 MIN: CPT | Performed by: PHYSICIAN ASSISTANT

## 2020-11-05 PROCEDURE — 87210 SMEAR WET MOUNT SALINE/INK: CPT | Performed by: PHYSICIAN ASSISTANT

## 2020-11-05 RX ORDER — METRONIDAZOLE 500 MG/1
500 TABLET ORAL 2 TIMES DAILY
Qty: 14 TABLET | Refills: 0 | Status: SHIPPED | OUTPATIENT
Start: 2020-11-05 | End: 2020-11-12

## 2020-11-05 ASSESSMENT — MIFFLIN-ST. JEOR: SCORE: 1446.98

## 2020-11-05 NOTE — PROGRESS NOTES
"Subjective     Ruthy Cameron is a 40 year old female who presents to clinic today for the following health issues:    HPI         Vaginal Symptoms  Onset/Duration: a few days  Description:  Vaginal Discharge: white   Itching (Pruritis): no  Burning sensation:  no  Odor: YES- but calming down  Accompanying Signs & Symptoms:  Urinary symptoms: no  Abdominal pain: no  Fever: no  History:   Sexually active: YES  New Partner: YES  Possibility of Pregnancy:  No- uses condoms  Recent antibiotic use: YES  Previous vaginitis issues: YES  Precipitating or alleviating factors: pt feels that it may be her condom use?  Therapies tried and outcome: none          Review of Systems   Constitutional, HEENT, cardiovascular, pulmonary, gi and gu systems are negative, except as otherwise noted.      Objective    /86   Pulse 76   Temp 98.5  F (36.9  C) (Tympanic)   Resp 16   Ht 1.676 m (5' 6\")   Wt 76 kg (167 lb 9.6 oz)   SpO2 98%   BMI 27.05 kg/m    Body mass index is 27.05 kg/m .  Physical Exam   GENERAL: alert and no distress  EYES: Eyes grossly normal to inspection  PSYCH: mentation appears normal, affect normal/bright    Results for orders placed or performed in visit on 11/05/20 (from the past 24 hour(s))   Wet prep    Specimen: Vagina   Result Value Ref Range    Specimen Description Vagina     Wet Prep Many  Clue cells seen   (A)     Wet Prep No WBC's seen     Wet Prep No Trichomonas seen     Wet Prep No yeast seen            Assessment & Plan     Vaginitis and vulvovaginitis  Has been prone to this, and knows symptoms well.  Gave option of e-visit in future if she would cecelia.  - Wet prep  - metroNIDAZOLE (FLAGYL) 500 MG tablet; Take 1 tablet (500 mg) by mouth 2 times daily for 7 days     BMI:   Estimated body mass index is 27.05 kg/m  as calculated from the following:    Height as of this encounter: 1.676 m (5' 6\").    Weight as of this encounter: 76 kg (167 lb 9.6 oz).                No follow-ups on " file.    Jamison Enciso PA-C  [facility]

## 2021-01-06 ENCOUNTER — TELEPHONE (OUTPATIENT)
Dept: FAMILY MEDICINE | Facility: CLINIC | Age: 41
End: 2021-01-06

## 2021-01-06 DIAGNOSIS — Z30.019 ENCOUNTER FOR FEMALE BIRTH CONTROL: Primary | ICD-10-CM

## 2021-01-06 RX ORDER — LEVONORGESTREL 1.5 MG/1
1.5 TABLET ORAL ONCE
Qty: 1 TABLET | Refills: 0 | Status: SHIPPED | OUTPATIENT
Start: 2021-01-06 | End: 2023-01-09

## 2021-01-06 NOTE — TELEPHONE ENCOUNTER
JS,    Patient called.  Asking for Plan B Rx - states can't afford buying OTC.  Sexual intercourse last night, condom broke.    Order T'd up.    Thanks,  Joann Bazan, RN      Triage note:  Call patient when Rx sent to pharmacy ( [phone])

## 2022-03-31 NOTE — PROGRESS NOTES
Ruthy is a 41 year old who is being evaluated via a billable video visit.      How would you like to obtain your AVS? MyChart  If the video visit is dropped, the invitation should be resent by:   Will anyone else be joining your video visit? No    Video Start Time: 10:54 AM    Assessment & Plan     Itching  Very well could be simply bed bugs bites, but cannot rule out scabies from history.  Will treat.  - permethrin (ELIMITE) 5 % external cream; Apply cream from head to toe (except the face); leave on for 8-14 hours then wash off with water; reapply in 1 week if live mites appear.                 No follow-ups on file.    Jamison Enciso PA-C  [facility]   Ruthy is a 41 year old who presents for the following health issues     HPI     oncern - Bug Bites  Onset: Fri/Sat/Sun - was staying at a hotel - bites showed up Monday  Description: Bed bug bites for ankle, knee, and arms   Progression of Symptoms:  Unsure - they are itchy  Accompanying Signs & Symptoms: Itchy  Previous history of similar problem: No  Precipitating factors:        Worsened by: NA  Alleviating factors:        Improved by: used disinfectant   Therapies tried and outcome: disinfectant       Review of Systems   Constitutional, HEENT, cardiovascular, pulmonary, gi and gu systems are negative, except as otherwise noted.      Objective           Vitals:  No vitals were obtained today due to virtual visit.    Physical Exam   GENERAL: Healthy, alert and no distress  EYES: Eyes grossly normal to inspection.  No discharge or erythema, or obvious scleral/conjunctival abnormalities.  RESP: No audible wheeze, cough, or visible cyanosis.  No visible retractions or increased work of breathing.    SKIN: Visible skin clear. No significant rash, abnormal pigmentation or lesions.  NEURO: Cranial nerves grossly intact.  Mentation and speech appropriate for age.  PSYCH: Mentation appears normal, affect normal/bright,  judgement and insight intact, normal speech and appearance well-groomed.                Video-Visit Details    Type of service:  Video Visit    Video End Time:11:14 AM    Originating Location (pt. Location): Home    Distant Location (provider location):  [facility]     Platform used for Video Visit: [employer]

## 2022-04-01 ENCOUNTER — VIRTUAL VISIT (OUTPATIENT)
Dept: FAMILY MEDICINE | Facility: CLINIC | Age: 42
End: 2022-04-01
Payer: COMMERCIAL

## 2022-04-01 DIAGNOSIS — L29.9 ITCHING: Primary | ICD-10-CM

## 2022-04-01 PROCEDURE — 99213 OFFICE O/P EST LOW 20 MIN: CPT | Mod: 95 | Performed by: PHYSICIAN ASSISTANT

## 2022-04-01 RX ORDER — PERMETHRIN 50 MG/G
CREAM TOPICAL
Qty: 60 G | Refills: 1 | Status: SHIPPED | OUTPATIENT
Start: 2022-04-01 | End: 2022-06-28

## 2022-06-28 ENCOUNTER — VIRTUAL VISIT (OUTPATIENT)
Dept: FAMILY MEDICINE | Facility: CLINIC | Age: 42
End: 2022-06-28
Payer: COMMERCIAL

## 2022-06-28 DIAGNOSIS — N94.6 MENSTRUAL CRAMP: Primary | ICD-10-CM

## 2022-06-28 PROCEDURE — 99214 OFFICE O/P EST MOD 30 MIN: CPT | Mod: 95 | Performed by: FAMILY MEDICINE

## 2022-06-28 RX ORDER — NAPROXEN SODIUM 220 MG
220 TABLET ORAL 3 TIMES DAILY PRN
Qty: 30 UNITS | Refills: 0 | Status: SHIPPED | OUTPATIENT
Start: 2022-06-28 | End: 2023-01-09

## 2022-06-28 NOTE — PROGRESS NOTES
Ruthy is a 42 year old who is being evaluated via a billable video visit.      How would you like to obtain your AVS? MyChart  If the video visit is dropped, the invitation should be resent by: Send to e-mail at: [email or web address]  Will anyone else be joining your video visit? No          Assessment & Plan   42 year old female   with new onset menstrual cramping for past few months.  There are associated with relatively heavy menstruation.  Menstrual cramp  Plan.  Proceed with ultrasound to rule out uterine fibroid and endometrial hyperplasia.  Advised to make a separate appointment for pelvic exam, she is overdue for Pap smear as well.  She can choose to do a complete physical with  PCP or available provider.    She reports ibuprofen is not that helpful.  She is advised to stop that and use Aleve with meals up to 3 times daily for 1 to 2 days at the onset of her menstruation.    - US Pelvic Transabdominal and Transvaginal; Future  - naproxen sodium (ANAPROX) 220 MG tablet; Take 1 tablet (220 mg) by mouth 3 times daily as needed for moderate pain (menstural cramping)    Ordering of each unique test             Return in about 2 weeks (around 2022) for routine physical, follow up with PCP.    Trisha London MD  [facility] [place]    Subjective   Ruthy is a 42 year old, presenting for the following health issues:  No chief complaint on file.      HPI     She is here to discuss heavy mensuration and menstrual cramping- a new change in past few months < 12 months  Ibuprofen does not help.  Last menstural period : 22  Menstrual cramping moderate to sever.  Menstrual cramping glenn first day, with heavier mensuration & clotting       Menarche started age 11yo  Regular periods  History of Depo for 3 yrs and stopped by age 17 yrs     2 kids, Youngest is   Does not think she is pregnant because of abstinence      Review of Systems   Constitutional, HEENT, cardiovascular, pulmonary,  GI, , musculoskeletal, neuro, skin, endocrine and psych systems are negative, except as otherwise noted.      Objective           Vitals:  No vitals were obtained today due to virtual visit.    Physical Exam   GENERAL: Healthy, alert and no distress  EYES: Eyes grossly normal to inspection.  No discharge or erythema, or obvious scleral/conjunctival abnormalities.  RESP: No audible wheeze, cough, or visible cyanosis.  No visible retractions or increased work of breathing.    SKIN: Visible skin clear. No significant rash, abnormal pigmentation or lesions.  NEURO: Cranial nerves grossly intact.  Mentation and speech appropriate for age.  PSYCH: Mentation appears normal, affect normal/bright, judgement and insight intact, normal speech and appearance well-groomed.                Video-Visit Details    Video Start Time: 12;57    Type of service:  Video Visit    Video End Time:1:17 PM    Originating Location (pt. Location): Home    Distant Location (provider location):  [facility]     Platform used for Video Visit: [employer]  ..

## 2022-08-21 ENCOUNTER — HEALTH MAINTENANCE LETTER (OUTPATIENT)
Age: 42
End: 2022-08-21

## 2022-10-08 ENCOUNTER — E-VISIT (OUTPATIENT)
Dept: URGENT CARE | Facility: CLINIC | Age: 42
End: 2022-10-08
Payer: COMMERCIAL

## 2022-10-08 DIAGNOSIS — N76.0 BACTERIAL VAGINOSIS: Primary | ICD-10-CM

## 2022-10-08 DIAGNOSIS — B96.89 BACTERIAL VAGINOSIS: Primary | ICD-10-CM

## 2022-10-08 PROCEDURE — 99421 OL DIG E/M SVC 5-10 MIN: CPT | Performed by: FAMILY MEDICINE

## 2022-10-08 RX ORDER — METRONIDAZOLE 500 MG/1
500 TABLET ORAL 2 TIMES DAILY
Qty: 14 TABLET | Refills: 0 | Status: SHIPPED | OUTPATIENT
Start: 2022-10-08 | End: 2022-10-15

## 2022-10-08 NOTE — PATIENT INSTRUCTIONS
Thank you for choosing us for your care. I have placed an order for a prescription so that you can start treatment. View your full visit summary for details by clicking on the link below. Your pharmacist will able to address any questions you may have about the medication.     If you re not feeling better within 2-3 days, please schedule an appointment.  You can schedule an appointment right here in [facility], or call [phone]  If the visit is for the same symptoms as your eVisit, we ll refund the cost of your eVisit if seen within seven days.      Bacterial Vaginosis    You have a vaginal infection called bacterial vaginosis (BV). Both good and bad bacteria are present in a healthy vagina. BV occurs when these bacteria get out of balance. The number of bad bacteria increase. And the number of good bacteria decrease. BV is linked with sexual activity, but it's not a sexually transmitted infection (STI).   BV may or may not cause symptoms. If symptoms do occur, they can include:     Thin, gray, milky-white, or sometimes green discharge    Unpleasant odor or  fishy  smell    Itching, burning, or pain in or around the vagina  It is not known what causes BV, but certain factors can make the problem more likely. These can include:     Douching    Spermicides    Use of antibiotics    Change in hormone levels with pregnancy, breastfeeding, or menopause    Having sex with a new partner    Having sex with more than one partner  BV will sometimes go away on its own. But treatment is often advised. This is because untreated BV can raise the risk of more serious health problems such as:     Pelvic inflammatory disease (PID)     delivery (giving birth to a baby early if you re pregnant)    HIV and some other sexually transmitted infections (STIs)    Infection after surgery on the reproductive organs  Home care  General care    BV is most often treated with medicines called antibiotics. These may be given as pills or  as a vaginal cream. If antibiotics are prescribed, be sure to use them exactly as directed. And complete all of the medicine, even if your symptoms go away.    Don't douche or having sex during treatment.    If you have sex with a female partner, ask your healthcare provider if she should also be treated.  Prevention    Don't douche.    Don't have sex. If you do have sex, then take steps to lower your risk:  ? Use condoms when having sex.  ? Limit the number of sex partners you have.    Follow-up care  Follow up with your healthcare provider, or as advised.   When to get medical advice  Call your healthcare provider right away if:     You have a fever of 100.4 F (38 C) or higher, or as directed by your provider.    Your symptoms get worse, or they don t go away within a few days of starting treatment.    You have new pain in the lower belly or pelvic region.    You have side effects that bother you or a reaction to the pills or cream you re prescribed.    You or any of your sex partners have new symptoms, such as a rash, joint pain, or sores.  [employer] last reviewed this educational content on 6/1/2020 2000-2021 [employer]. All rights reserved. This information is not intended as a substitute for professional medical care. Always follow your healthcare professional's instructions.

## 2022-11-21 ENCOUNTER — HEALTH MAINTENANCE LETTER (OUTPATIENT)
Age: 42
End: 2022-11-21

## 2023-01-03 ENCOUNTER — NURSE TRIAGE (OUTPATIENT)
Dept: FAMILY MEDICINE | Facility: CLINIC | Age: 43
End: 2023-01-03

## 2023-01-03 NOTE — TELEPHONE ENCOUNTER
"Spoke with patient regarding symptom of drainage from nipple. Drainage began as clear, and has been developing some blood in drainage.    Patient denies pain, lump, rash, or fever.     Patient had a lump removed from the left breast back in 2014. Patient requesting to see same provider as she saw last for breast symptoms.    Per protocol, patient should be seen in clinic within 3 days. Assisted with booking soonest available appointment on 1/9 at 2:30 pm. Advised patient to be seen sooner if symptoms worsen in the meantime. Patient verbalized understanding and has no further questions at this time.     KVNG Aiken RN  [facility]    Reason for Disposition    Patient wants to be seen    Additional Information    Negative: Chest pain    Negative: Breastfeeding questions about baby    Negative: Breastfeeding questions about mother (breast symptoms or feeling sick)    Negative: Breastfeeding questions about mother's medicines and diet    Negative: Postpartum breast pain and swelling, not breastfeeding    Negative: Small spot, skin growth or mole    Negative: SEVERE breast pain and fever > 103 F  (39.4 C)    Negative: Patient sounds very sick or weak to the triager    Negative: Breast looks infected (spreading redness, feels hot or painful to touch) and fever    Negative: Breast looks infected (spreading redness, feels hot or painful to touch) and no fever    Negative: Painful rash and multiple small blisters grouped together (i.e., dermatomal distribution or \"band\" or \"stripe\")    Negative: Cuts, burns, or bruises of breasts and suspicious history for the injury    Negative: Breast lump    Negative: Nipple discharge and bloody    Negative: Nipple is inverted (i.e., points inward)  (Exception: long-term physical characteristic, present for many years)    Negative: Dry flaking-peeling skin of nipple    Negative: Change in shape or appearance of breast    Negative: Dimpling of skin of breast " (i.e., skin looks like the outside of an orange peel)    Protocols used: BREAST SYMPTOMS-A-OH

## 2023-01-03 NOTE — TELEPHONE ENCOUNTER
Reason for Call:  Other appointment    Detailed comments: Pt states she had a lump removed from her breast in 2014. She is now experiencing drainage from right nipple. It started out clear, but now has some blood mixed in. Pt only wants to see Elinor Dickey. She made the first available appt on 01/27, but would like to get in sooner. Please advise    Phone Number Patient can be reached at: Home number on file [phone] (home)    Best Time: any    Can we leave a detailed message on this number? YES    Call taken on 1/3/2023 at 10:10 AM by Selina Burks

## 2023-01-09 ENCOUNTER — OFFICE VISIT (OUTPATIENT)
Dept: FAMILY MEDICINE | Facility: CLINIC | Age: 43
End: 2023-01-09
Payer: COMMERCIAL

## 2023-01-09 VITALS
SYSTOLIC BLOOD PRESSURE: 142 MMHG | WEIGHT: 187.4 LBS | HEART RATE: 99 BPM | DIASTOLIC BLOOD PRESSURE: 90 MMHG | TEMPERATURE: 99 F | BODY MASS INDEX: 30.12 KG/M2 | OXYGEN SATURATION: 99 % | HEIGHT: 66 IN

## 2023-01-09 DIAGNOSIS — R03.0 ELEVATED BP WITHOUT DIAGNOSIS OF HYPERTENSION: ICD-10-CM

## 2023-01-09 DIAGNOSIS — N64.52 NIPPLE DISCHARGE: Primary | ICD-10-CM

## 2023-01-09 LAB
ERYTHROCYTE [DISTWIDTH] IN BLOOD BY AUTOMATED COUNT: 13.1 % (ref 10–15)
HCT VFR BLD AUTO: 37.9 % (ref 35–47)
HGB BLD-MCNC: 12.5 G/DL (ref 11.7–15.7)
MCH RBC QN AUTO: 27.4 PG (ref 26.5–33)
MCHC RBC AUTO-ENTMCNC: 33 G/DL (ref 31.5–36.5)
MCV RBC AUTO: 83 FL (ref 78–100)
PLATELET # BLD AUTO: 337 10E3/UL (ref 150–450)
PROLACTIN SERPL 3RD IS-MCNC: 14 NG/ML (ref 5–23)
RBC # BLD AUTO: 4.56 10E6/UL (ref 3.8–5.2)
WBC # BLD AUTO: 5.1 10E3/UL (ref 4–11)

## 2023-01-09 PROCEDURE — 84146 ASSAY OF PROLACTIN: CPT | Performed by: PHYSICIAN ASSISTANT

## 2023-01-09 PROCEDURE — 99214 OFFICE O/P EST MOD 30 MIN: CPT | Performed by: PHYSICIAN ASSISTANT

## 2023-01-09 PROCEDURE — 85027 COMPLETE CBC AUTOMATED: CPT | Performed by: PHYSICIAN ASSISTANT

## 2023-01-09 PROCEDURE — 36415 COLL VENOUS BLD VENIPUNCTURE: CPT | Performed by: PHYSICIAN ASSISTANT

## 2023-01-09 PROCEDURE — 84443 ASSAY THYROID STIM HORMONE: CPT | Performed by: PHYSICIAN ASSISTANT

## 2023-01-09 NOTE — PROGRESS NOTES
"  Assessment & Plan     Nipple discharge  Will get labs and imaging. Referral to breast surgeon due to history of similar.   - TSH WITH FREE T4 REFLEX; Future  - CBC with platelets; Future  - Prolactin; Future  - MA Diagnostic Digital Bilateral; Future  - US Breast Right Limited 1-3 Quadrants; Future  - Breast Provider  Referral; Future  - TSH WITH FREE T4 REFLEX  - CBC with platelets  - Prolactin    Elevated BP without diagnosis of hypertension  Monitor blood pressure at home and with PCP.                BMI:   Estimated body mass index is 30.25 kg/m  as calculated from the following:    Height as of this encounter: 1.676 m (5' 6\").    Weight as of this encounter: 85 kg (187 lb 6.4 oz).           No follow-ups on file.    Elinor Dickey PA-C  [facility] KIMBERLY Bliss is a 42 year old, presenting for the following health issues:  Nipple Discharge and Health Maintenance      History of Present Illness       Reason for visit:  Nipple discharge    She eats 2-3 servings of fruits and vegetables daily.She consumes 0 sweetened beverage(s) daily.She exercises with enough effort to increase her heart rate 10 to 19 minutes per day.  She exercises with enough effort to increase her heart rate 4 days per week.   She is taking medications regularly.      Lump was removed from the left breast.   Right breast having discharge. Happened in 2019- had ductogram, stopped.     Now having blood in the discharge again.   Started 1 month ago.   No daily or regular with the discharge. Does not have to wear a pad. No odor. No pain. No rash or breast mass.     No drainage from the left breast.   Still having periods, denies possibility of pregnancy.     No vaccine recently.   .    Review of Systems         Objective    BP (!) 161/91 (BP Location: Right arm, Patient Position: Chair, Cuff Size: Adult Regular)   Pulse 99   Temp 99  F (37.2  C) (Oral)   Ht 1.676 m (5' 6\")   Wt 85 kg (187 lb 6.4 oz)   " LMP 12/29/2022 (Approximate)   SpO2 99%   BMI 30.25 kg/m    Body mass index is 30.25 kg/m .  Physical Exam   GENERAL: healthy, alert and no distress  BREAST: normal without masses, tenderness or nipple discharge and no palpable axillary masses or adenopathy

## 2023-01-10 LAB — TSH SERPL DL<=0.005 MIU/L-ACNC: 1.49 MU/L (ref 0.4–4)

## 2023-01-17 ENCOUNTER — HOSPITAL ENCOUNTER (OUTPATIENT)
Dept: ULTRASOUND IMAGING | Facility: CLINIC | Age: 43
Discharge: HOME | End: 2023-01-17
Attending: PHYSICIAN ASSISTANT
Payer: COMMERCIAL

## 2023-01-17 DIAGNOSIS — N64.52 NIPPLE DISCHARGE: ICD-10-CM

## 2023-01-17 PROCEDURE — 76642 ULTRASOUND BREAST LIMITED: CPT | Mod: RT

## 2023-01-31 ENCOUNTER — HOSPITAL ENCOUNTER (OUTPATIENT)
Dept: ULTRASOUND IMAGING | Facility: CLINIC | Age: 43
Discharge: HOME | End: 2023-01-31
Attending: PHYSICIAN ASSISTANT
Payer: COMMERCIAL

## 2023-01-31 DIAGNOSIS — N64.52 NIPPLE DISCHARGE: ICD-10-CM

## 2023-01-31 PROCEDURE — A4648 IMPLANTABLE TISSUE MARKER: HCPCS

## 2023-01-31 PROCEDURE — 250N000009 HC RX 250: Performed by: RADIOLOGY

## 2023-01-31 PROCEDURE — 88305 TISSUE EXAM BY PATHOLOGIST: CPT | Mod: 26 | Performed by: PATHOLOGY

## 2023-01-31 PROCEDURE — 88305 TISSUE EXAM BY PATHOLOGIST: CPT | Mod: TC | Performed by: PHYSICIAN ASSISTANT

## 2023-01-31 RX ADMIN — LIDOCAINE HYDROCHLORIDE 5 ML: 10 INJECTION, SOLUTION EPIDURAL; INFILTRATION; INTRACAUDAL; PERINEURAL at 08:59

## 2023-01-31 NOTE — DISCHARGE INSTRUCTIONS

## 2023-02-01 ENCOUNTER — TELEPHONE (OUTPATIENT)
Dept: MAMMOGRAPHY | Facility: CLINIC | Age: 43
End: 2023-02-01
Payer: COMMERCIAL

## 2023-02-01 LAB
PATH REPORT.COMMENTS IMP SPEC: NORMAL
PATH REPORT.FINAL DX SPEC: NORMAL
PATH REPORT.GROSS SPEC: NORMAL
PATH REPORT.MICROSCOPIC SPEC OTHER STN: NORMAL
PHOTO IMAGE: NORMAL

## 2023-02-01 NOTE — TELEPHONE ENCOUNTER
Pathology report reviewed with our breast radiologist Dr Kim, who confirmed the recent breast imaging is concordant with the final pathology results below.    I phoned Ms Cameron, confirmed her full name, date of birth, and informed patient of her ultrasound Guided right Breast Needle Biopsy (01/31/23) results showing Sclerotic intraductal papilloma, fibrocystic change, proliferative type, no evidence of malignancy.    Recommended follow up is surgical consult. Patient will see Dr Altamirano on 2-6-23 at 1030 am.   Patient states no problems or concerns with her biopsy site.   Questions were answered and my phone number given if she has further questions or concerns.  I informed patient I will notify the ordering provider of the results and recommendations for follow up.  Patient verbalized understanding and agrees with the plan of care.     Luanne Khan RN CBCN  Breast Care Nurse Coordinator  [facility]  [phone]

## 2023-02-06 ENCOUNTER — OFFICE VISIT (OUTPATIENT)
Dept: SURGERY | Facility: CLINIC | Age: 43
End: 2023-02-06
Payer: COMMERCIAL

## 2023-02-06 ENCOUNTER — TELEPHONE (OUTPATIENT)
Dept: SURGERY | Facility: CLINIC | Age: 43
End: 2023-02-06

## 2023-02-06 VITALS
HEIGHT: 66 IN | OXYGEN SATURATION: 99 % | WEIGHT: 180 LBS | HEART RATE: 80 BPM | BODY MASS INDEX: 28.93 KG/M2 | SYSTOLIC BLOOD PRESSURE: 130 MMHG | DIASTOLIC BLOOD PRESSURE: 90 MMHG

## 2023-02-06 DIAGNOSIS — D24.1 INTRADUCTAL PAPILLOMA OF RIGHT BREAST: Primary | ICD-10-CM

## 2023-02-06 PROCEDURE — 99204 OFFICE O/P NEW MOD 45 MIN: CPT | Performed by: SURGERY

## 2023-02-06 NOTE — LETTER
[facility]    [address], [address]  [place], [place] [address]  Phone [phone]  Fax [phone]    [address], [address]  [place] [address]  Phone [phone]  Fax [phone]    [email or web address]   Ruthy Cameron    You have been scheduled for:    RIGHT BREAST RADIOFREQUENCY TAG LOCALIZATION     Date: 2/15/2023  Time: 7:30 AM   Location: [facility]  [address]   [place] [address] [phone]         Please check in at  7:15 AM

## 2023-02-06 NOTE — LETTER
[facility]    [address], [address]  [place], [place] [address]  Phone [phone]  Fax [phone] [address], [address]  [facility] [place] [address]  Phone [phone]  Fax [phone]    [email or web address]   Showering Before Surgery      Your surgeon has asked you to take 2 showers before surgery.    Why is this important?  It is normal for bacteria (germs) to be on your skin. The skin protects us from these germs. When you have surgery, we cut the skin. Sometimes germs get into the cuts and cause infection (illness caused by germs). By following the instructions below and using special soap, you will lower the number of germs on your skin. This decreases your chance of infection.  Special soap  Buy or get 8 ounces of antiseptic surgical soap called 4% CHG. Common name brands of this soap are Hibiclens and Exidine.  You can find it at your local pharmacy, clinic or retail store. If you have trouble, ask your pharmacist to help you find the right substitute.  A note about shaving:  Do not shave within 12 inches of your incision (surgical cut) area for at least 3 days before surgery. Shaving can make small cuts in the skin. This puts you at a higher risk of infection.  Items you will need for each shower:   1 newly washed towel   4 ounces of one of the above soaps   Clean pajamas or clothes to change into    Follow these instructions:  Follow these steps the evening before surgery and the morning of surgery.  1. Wash your hair and body with your regular shampoo and soap. Make sure you rinse the shampoo and soap from your hair and body.  2. Using clean hands, apply about 2 ounces of soap gently on your skin from your ear lobes to your toes. Use on your groin area last. Do not use this soap on your face or head. If you get any soap in your eyes, ears or mouth, rinse right away.  3. Repeat step 2. It is very important to let the  soap stay on your skin for at least 1 minute.    4. Rinse well and dry off using a clean towel.    If you feel any tingling, itching or other irritation, rinse right away. It is normal to feel some coolness on the skin after using the antiseptic soap. Your skin may feel a bit dry after the shower, but do not use any lotions, creams or moisturizers. Do not use hair spray or other products in your hair.  5.  Dress in freshly washed clothes or pajamas. Use fresh pillowcases and sheets on your bed.    Repeat these steps the morning of surgery.  If you have any questions about showering or an allergy to CHG soap, please call your surgery center.

## 2023-02-06 NOTE — PROGRESS NOTES
Breast Patients    BREAST PATIENTS (ALL)    1-Do you have any of the following symptoms? Nipple Discharge (clear, bloody, other)  2-In which breast are you having the symptoms? right  3-Have you had a Mammogram? No  4-Have you ever had a breast cyst drained? No  5-Have you ever had a breast biopsy? Yes:  Left   -   Date:  2018  6-Have you ever had Breast Cancer? No   7-Is there a history of Breast Cancer in your family? Yes   Relationship to you:    Aunt  8-Have you ever had Ovarian Cancer? No  9-Is there a history of Ovarian Cancer in your family? No  10-Is there a history of Colon Cancer in your family?  No  11-Is there a history of Uterine Cancer in your family?  No  12-Any known genetic abnormalities in your family?  No  10-Summarize your caffeine intake (i.e. coffee, tea, chocolate, soda etc.): coffee every other day    BREAST PATIENTS (FEMALE)    14-What age did your periods begin? 10  15-Date your last menstrual period began? 1/20/2023  16-Number of full-term pregnancies: 2  17-Your age when your first child was born? 17  18-Did you nurse your children? Yes  19-Are you pregnant now? No  20-Have you begun menopause? No  21-Have you had either ovary removed?No  22-Do you have breast implants? No   23-Do you use hormone replacement therapy?  No  24-Have you taken oral contraceptive pills?  Yes, For how many years?  years ago  25-Have you had an intrauterine device (IUD) placed?  No  26-What is your current bra size?  38 D

## 2023-02-06 NOTE — PROGRESS NOTES
CC: Right breast papilloma, history of nipple discharge.    HPI:  Right breast discharge noted four years ago. She has had symptoms off and on and even underwent a ductogram in March of 2019. Usually the drainage is clear but more recently it has been slightly bloody. It is always unilateral. She has not noted any masses on self exam.  More recently she underwent ultrasound of the right areola region and a subsequent biopsy which showed a papilloma without atypia (at 10:00)  Does perform self breast exams.  Previous breast biopsies: Yes: excisional biopsy done on the left, benign fibrous disease per patient report.  Previous cyst aspiration: No    Family history of breast cancer: Yes - great aunt  Family history of ovarian cancer: No    Imaging personally reviewed by me.    US reveals: a nodule measuring 0.8 cm at the 10 o'clock retro-areolar area. She has a stable mass measuring 2.2cm at 5:00.    Percutaneous core needle biopsy reveals: a papilloma with sclerosis     PE:  Vitals: LMP 12/29/2022 (Approximate)   General appearance: well-nourished, sitting comfortably, no apparent distress  Neck: Supple without thyromegaly, lymphadenopathy, masses  Lungs: Respirations unlabored  Abdomen: soft, nondistended  Extremities: Without edema  Neurologic: nonfocal, grossly intact times four extremities, alert and oriented times three  Psychiatric: Mood and affect are appropriate  Skin: Without lesions or rashes  Breast:     Exam deferred as there is no associate palpable mass.    PLAN:  Discussed options for close observation with self breast exams, follow up right retroareolar ultrasound and physical exam vs excisional biopsy.  Patient elects excisional biopsy. Given that this lesion is associated with nipple discharge, I agree with her elected option.    Keny Altamirano MD      Please route or send letter to:  Primary Care Provider (PCP)

## 2023-02-06 NOTE — TELEPHONE ENCOUNTER
Type of surgery: RIGHT BREAST LOCALIZED BREAST BIOPSY       Location of surgery: [facility]  Date and time of surgery: 2/16/2023 @ 10:00 AM   Surgeon: GEMA MCHUGH MD    Pre-Op Appt Date: PATIENT TO SCHEDULE    Post-Op Appt Date: PATIENT TO SCHEDULE     Packet sent out: Yes  Pre-cert/Authorization completed:  Not Applicable  Date: 2/6/2023         RIGHT BREAST LOCALIZED BREAST BIOPSY     GENERAL PT INST TO HAVE H&P WITH PCP 60 MIN REQ PA ASSIST RMO NMS   LOC 2/15/2023 AT 7:15 AM  NMS

## 2023-02-06 NOTE — LETTER
2023    RE: Ruthy Cameron, : 1980      CC: Right breast papilloma, history of nipple discharge.     HPI:  Right breast discharge noted four years ago. She has had symptoms off and on and even underwent a ductogram in 2019. Usually the drainage is clear but more recently it has been slightly bloody. It is always unilateral. She has not noted any masses on self exam.  More recently she underwent ultrasound of the right areola region and a subsequent biopsy which showed a papilloma without atypia (at 10:00)  Does perform self breast exams.  Previous breast biopsies: Yes: excisional biopsy done on the left, benign fibrous disease per patient report.  Previous cyst aspiration: No     Family history of breast cancer: Yes - great aunt  Family history of ovarian cancer: No     Imaging personally reviewed by me.     US reveals: a nodule measuring 0.8 cm at the 10 o'clock retro-areolar area. She has a stable mass measuring 2.2cm at 5:00.     Percutaneous core needle biopsy reveals: a papilloma with sclerosis      PE:  Vitals: LMP 2022 (Approximate)   General appearance: well-nourished, sitting comfortably, no apparent distress  Neck: Supple without thyromegaly, lymphadenopathy, masses  Lungs: Respirations unlabored  Abdomen: soft, nondistended  Extremities: Without edema  Neurologic: nonfocal, grossly intact times four extremities, alert and oriented times three  Psychiatric: Mood and affect are appropriate  Skin: Without lesions or rashes  Breast:                Exam deferred as there is no associate palpable mass.     PLAN:  Discussed options for close observation with self breast exams, follow up right retroareolar ultrasound and physical exam vs excisional biopsy.  Patient elects excisional biopsy. Given that this lesion is associated with nipple discharge, I agree with her elected option.       Keny Altamirano MD

## 2023-02-06 NOTE — LETTER
[facility]    [address], [address]  [place], [place] [address]  Phone [phone]  Fax [phone]    [address], [address]  [place] [address]  Phone [phone]  Fax [phone]    [email or web address]   2023     Ruthy Cameron    RE: [id number]  : 1980    Ruthy Cameron has been scheduled for surgery on 2023 at 10:00 AM  at [facility] with Dr Keny Altamirano.  The hospital is located at [address] in [place].      Please check in at the Surgery reception desk at 8:00 AM . This is located in the back of the hospital on the East side, just past the Emergency Room entrance.       DO NOT EAT OR DRINK ANYTHING 8 HOURS BEFORE YOUR ARRIVAL TIME.   You may have sips of clear liquids up until 2 hours before your arrival time. If you have been advised to take your medication, please do this early in the morning with just sips of clear liquid.        Hospital regulations require an updated pre-operative examination to be completed within 30 days of the procedure. This can be done by your primary care provider. Please ask them to fax documentation to [phone]. We also recommend you bring a copy with you.       You should shower before your surgery with Hibiclens or Exidine soap.  This can be found at your local pharmacy or you can pick it up from our office for free.  Please call our office if you have any questions.       You will be required to have an Adult (friend or family member) drive you home after your surgery and arrange for an adult to stay with you until the next morning.       You will receive several calls from our staff 3-7 days prior to your scheduled procedure with further details and to answer any questions you may have.      It is sometimes necessary to adjust the surgery schedule due to emergencies and additions to the schedule.  If your surgery is  affected by this, we greatly appreciate your flexibility and understanding in this matter      It is best if you call regarding post-operative questions between the hours of 8:00 am & 3:00 pm Monday-Friday, so you have access to the daytime care team that know you best.  ? Prescription refills are accepted during regular office hours only.      Please do not bring any Disability or FMLA papers to the hospital.  They need to be either faxed ([phone]), mailed or hand delivered to our office by you or a family member for completion.  ? Please allow 14 business days to complete paperwork.    If you have questions or concerns, please contact our office at [phone].

## 2023-02-14 NOTE — H&P (VIEW-ONLY)
[facility]  [address] [place] [address], [address]  [facility] [address]  Phone: [phone]  Primary Provider: Jamison Enciso  Pre-op Performing Provider: DUSTIN COBB      PREOPERATIVE EVALUATION:  Today's date: 2/15/2023    Ruthy Cameron is a 42 year old female who presents for a preoperative evaluation.    Surgical Information:  Surgery/Procedure: BIOPSY, BREAST, WITH RADIO FREQUENCY TAG LOCALIZATION right  Surgery Location: [facility]  Surgeon: Keny Dinh MD  Surgery Date: 2/16/23  Time of Surgery: 10:00am  Where patient plans to recover: At home with family  Fax number for surgical facility: Note does not need to be faxed, will be available electronically in Epic.    Type of Anesthesia Anticipated: General    Assessment & Plan     The proposed surgical procedure is considered INTERMEDIATE risk.    Pre-op exam  Intraductal papilloma of right breast    On no chronic medication. No vitamins or fish oil. Has been avoiding NSAIDs. Cleared for surgery pending labs. CBC and BMP today. Did have a CBC a month ago which was stable.     - CBC with platelets and differential  - Basic metabolic panel  (Ca, Cl, CO2, Creat, Gluc, K, Na, BUN)    Risks and Recommendations:  The patient has the following additional risks and recommendations for perioperative complications:   - No identified additional risk factors other than previously addressed    Medication Instructions:  Patient is on no chronic medications    RECOMMENDATION:  APPROVAL GIVEN to proceed with proposed procedure pending review of diagnostic evaluation.      20 minutes spent on the date of the encounter doing chart review, review of test results, interpretation of tests, patient visit and documentation       Subjective     HPI related to upcoming procedure:     Here today for pre-op clearance in anticipation of a right breast biospy happening on 2/16/23.   Feels well overall. No concerns today about her  health. She has had anesthesia in the past without issues.     Preop Questions 2/15/2023   1. Have you ever had a heart attack or stroke? No   2. Have you ever had surgery on your heart or blood vessels, such as a stent placement, a coronary artery bypass, or surgery on an artery in your head, neck, heart, or legs? No   3. Do you have chest pain with activity? No   4. Do you have a history of  heart failure? No   5. Do you currently have a cold, bronchitis or symptoms of other infection? No   6. Do you have a cough, shortness of breath, or wheezing? No   7. Do you or anyone in your family have previous history of blood clots? YES - Mom and first cousin. No known genetic pre-disposition.   8. Do you or does anyone in your family have a serious bleeding problem such as prolonged bleeding following surgeries or cuts? No   9. Have you ever had problems with anemia or been told to take iron pills? No   10. Have you had any abnormal blood loss such as black, tarry or bloody stools, or abnormal vaginal bleeding? No   11. Have you ever had a blood transfusion? No   12. Are you willing to have a blood transfusion if it is medically needed before, during, or after your surgery? Yes   13. Have you or any of your relatives ever had problems with anesthesia? No   14. Do you have sleep apnea, excessive snoring or daytime drowsiness? No   15. Do you have any artifical heart valves or other implanted medical devices like a pacemaker, defibrillator, or continuous glucose monitor? No   16. Do you have artificial joints? No   17. Are you allergic to latex? No   18. Is there any chance that you may be pregnant? No       Preoperative Review of :   reviewed - no controlled substances reflected in medication list.      Review of Systems  Constitutional, neuro, ENT, endocrine, pulmonary, cardiac, gastrointestinal, genitourinary, musculoskeletal, integument and psychiatric systems are negative, except as otherwise noted.    Patient  Active Problem List    Diagnosis Date Noted     Menstrual cramp 06/28/2022     Priority: Medium     Breast mass 05/02/2014     Priority: Medium     High risk HPV infection 05/02/2013     Priority: Medium     7/14/06 ASCUS - pregnant  2008 NIL pap  5/22/09 NIL pap  4/23/13  NIL/+ HR HPV (58). Plan-- repeat pap and HPV in 1 year.  If still + for HR HPV in 1 year, will recommend colposcopy.  5/2/14:  LSIL, + HR HPV 58. Plan colp  7/22/14 reminder letter sent- returned to sender on 7/31/14 9/9/14 reminder phone call made  12/18/14 lost to follow up  5/6/15 pap NIL/+ HR HPV. Plan: colposcopy  6/30/15 colposcopy. squamous atypia suggestive of koilocytosis. No high grade dysplasia. Plan: repeat pap with HPV cotest in 1 year. Due 6/30/16.   8/25/16 NIL pap/neg HR HPV. Plan: cotest in 3 years.        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      Past Surgical History:   Procedure Laterality Date     EGD  2018     SOFT TISSUE SURGERY Left     Left breast lumpectomy     WISDOM TOOTH EXTRACTION      approx age 25     No current outpatient medications on file.       No Known Allergies     Social History     Tobacco Use     Smoking status: Some Days     Packs/day: 0.50     Types: Cigarettes     Smokeless tobacco: Never   Substance Use Topics     Alcohol use: Yes     Comment: Monthly  "    Family History   Problem Relation Age of Onset     Neurologic Disorder Father 40        Brain Aneurysm     Hypertension Father      Cancer Paternal Grandmother      Breast Cancer Other      History   Drug Use Unknown     Comment: Marijuana          Objective     /75   Pulse 92   Temp 98.6  F (37  C) (Oral)   Ht 1.676 m (5' 6\")   Wt 85.3 kg (188 lb)   LMP 02/15/2023 (Approximate)   SpO2 100%   Breastfeeding No   BMI 30.34 kg/m      Physical Exam    GENERAL APPEARANCE: healthy, alert and no distress     EYES: EOMI, PERRL     HENT: ear canals and TM's normal and nose and mouth without ulcers or lesions     NECK: no adenopathy, no asymmetry, masses, or scars and thyroid normal to palpation     RESP: lungs clear to auscultation - no rales, rhonchi or wheezes     CV: regular rates and rhythm, normal S1 S2, no S3 or S4 and no murmur, click or rub     ABDOMEN:  soft, nontender, no HSM or masses and bowel sounds normal     MS: extremities normal- no gross deformities noted, no evidence of inflammation in joints, FROM in all extremities.     SKIN: no suspicious lesions or rashes     NEURO: Normal strength and tone, sensory exam grossly normal, mentation intact and speech normal     PSYCH: mentation appears normal. and affect normal/bright     LYMPHATICS: No cervical adenopathy    Recent Labs   Lab Test 01/09/23  1503   HGB 12.5           Diagnostics:  Labs pending at this time.  Results will be reviewed when available.   No EKG required, no history of coronary heart disease, significant arrhythmia, peripheral arterial disease or other structural heart disease.    Revised Cardiac Risk Index (RCRI):  The patient has the following serious cardiovascular risks for perioperative complications:   - No serious cardiac risks = 0 points     RCRI Interpretation: 0 points: Class I (very low risk - 0.4% complication rate)    The likelihood of other entities in the differential is insufficient to justify any " further testing for them at this time. This was explained to the patient. The patient was advised that persistent or worsening symptoms would require further evaluation. Patient advised to call the office and if unable to reach to go to the emergency room if they develop any new or worsening symptoms. Expressed understanding and agreement with above stated plan.       Signed Electronically by: Gume Vigil PA-C  Copy of this evaluation report is provided to requesting physician.

## 2023-02-14 NOTE — PROGRESS NOTES
[facility]  [address] [place] [address], [address]  [facility] [address]  Phone: [phone]  Primary Provider: Jamison Enciso  Pre-op Performing Provider: DUSTIN COBB      PREOPERATIVE EVALUATION:  Today's date: 2/15/2023    Ruthy Cameron is a 42 year old female who presents for a preoperative evaluation.    Surgical Information:  Surgery/Procedure: BIOPSY, BREAST, WITH RADIO FREQUENCY TAG LOCALIZATION right  Surgery Location: [facility]  Surgeon: Keny Dinh MD  Surgery Date: 2/16/23  Time of Surgery: 10:00am  Where patient plans to recover: At home with family  Fax number for surgical facility: Note does not need to be faxed, will be available electronically in Epic.    Type of Anesthesia Anticipated: General    Assessment & Plan     The proposed surgical procedure is considered INTERMEDIATE risk.    Pre-op exam  Intraductal papilloma of right breast    On no chronic medication. No vitamins or fish oil. Has been avoiding NSAIDs. Cleared for surgery pending labs. CBC and BMP today. Did have a CBC a month ago which was stable.     - CBC with platelets and differential  - Basic metabolic panel  (Ca, Cl, CO2, Creat, Gluc, K, Na, BUN)    Risks and Recommendations:  The patient has the following additional risks and recommendations for perioperative complications:   - No identified additional risk factors other than previously addressed    Medication Instructions:  Patient is on no chronic medications    RECOMMENDATION:  APPROVAL GIVEN to proceed with proposed procedure pending review of diagnostic evaluation.      20 minutes spent on the date of the encounter doing chart review, review of test results, interpretation of tests, patient visit and documentation       Subjective     HPI related to upcoming procedure:     Here today for pre-op clearance in anticipation of a right breast biospy happening on 2/16/23.   Feels well overall. No concerns today about her  health. She has had anesthesia in the past without issues.     Preop Questions 2/15/2023   1. Have you ever had a heart attack or stroke? No   2. Have you ever had surgery on your heart or blood vessels, such as a stent placement, a coronary artery bypass, or surgery on an artery in your head, neck, heart, or legs? No   3. Do you have chest pain with activity? No   4. Do you have a history of  heart failure? No   5. Do you currently have a cold, bronchitis or symptoms of other infection? No   6. Do you have a cough, shortness of breath, or wheezing? No   7. Do you or anyone in your family have previous history of blood clots? YES - Mom and first cousin. No known genetic pre-disposition.   8. Do you or does anyone in your family have a serious bleeding problem such as prolonged bleeding following surgeries or cuts? No   9. Have you ever had problems with anemia or been told to take iron pills? No   10. Have you had any abnormal blood loss such as black, tarry or bloody stools, or abnormal vaginal bleeding? No   11. Have you ever had a blood transfusion? No   12. Are you willing to have a blood transfusion if it is medically needed before, during, or after your surgery? Yes   13. Have you or any of your relatives ever had problems with anesthesia? No   14. Do you have sleep apnea, excessive snoring or daytime drowsiness? No   15. Do you have any artifical heart valves or other implanted medical devices like a pacemaker, defibrillator, or continuous glucose monitor? No   16. Do you have artificial joints? No   17. Are you allergic to latex? No   18. Is there any chance that you may be pregnant? No       Preoperative Review of :   reviewed - no controlled substances reflected in medication list.      Review of Systems  Constitutional, neuro, ENT, endocrine, pulmonary, cardiac, gastrointestinal, genitourinary, musculoskeletal, integument and psychiatric systems are negative, except as otherwise noted.    Patient  Active Problem List    Diagnosis Date Noted     Menstrual cramp 06/28/2022     Priority: Medium     Breast mass 05/02/2014     Priority: Medium     High risk HPV infection 05/02/2013     Priority: Medium     7/14/06 ASCUS - pregnant  2008 NIL pap  5/22/09 NIL pap  4/23/13  NIL/+ HR HPV (58). Plan-- repeat pap and HPV in 1 year.  If still + for HR HPV in 1 year, will recommend colposcopy.  5/2/14:  LSIL, + HR HPV 58. Plan colp  7/22/14 reminder letter sent- returned to sender on 7/31/14 9/9/14 reminder phone call made  12/18/14 lost to follow up  5/6/15 pap NIL/+ HR HPV. Plan: colposcopy  6/30/15 colposcopy. squamous atypia suggestive of koilocytosis. No high grade dysplasia. Plan: repeat pap with HPV cotest in 1 year. Due 6/30/16.   8/25/16 NIL pap/neg HR HPV. Plan: cotest in 3 years.        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      Past Surgical History:   Procedure Laterality Date     EGD  2018     SOFT TISSUE SURGERY Left     Left breast lumpectomy     WISDOM TOOTH EXTRACTION      approx age 25     No current outpatient medications on file.       No Known Allergies     Social History     Tobacco Use     Smoking status: Some Days     Packs/day: 0.50     Types: Cigarettes     Smokeless tobacco: Never   Substance Use Topics     Alcohol use: Yes     Comment: Monthly  "    Family History   Problem Relation Age of Onset     Neurologic Disorder Father 40        Brain Aneurysm     Hypertension Father      Cancer Paternal Grandmother      Breast Cancer Other      History   Drug Use Unknown     Comment: Marijuana          Objective     /75   Pulse 92   Temp 98.6  F (37  C) (Oral)   Ht 1.676 m (5' 6\")   Wt 85.3 kg (188 lb)   LMP 02/15/2023 (Approximate)   SpO2 100%   Breastfeeding No   BMI 30.34 kg/m      Physical Exam    GENERAL APPEARANCE: healthy, alert and no distress     EYES: EOMI, PERRL     HENT: ear canals and TM's normal and nose and mouth without ulcers or lesions     NECK: no adenopathy, no asymmetry, masses, or scars and thyroid normal to palpation     RESP: lungs clear to auscultation - no rales, rhonchi or wheezes     CV: regular rates and rhythm, normal S1 S2, no S3 or S4 and no murmur, click or rub     ABDOMEN:  soft, nontender, no HSM or masses and bowel sounds normal     MS: extremities normal- no gross deformities noted, no evidence of inflammation in joints, FROM in all extremities.     SKIN: no suspicious lesions or rashes     NEURO: Normal strength and tone, sensory exam grossly normal, mentation intact and speech normal     PSYCH: mentation appears normal. and affect normal/bright     LYMPHATICS: No cervical adenopathy    Recent Labs   Lab Test 01/09/23  1503   HGB 12.5           Diagnostics:  Labs pending at this time.  Results will be reviewed when available.   No EKG required, no history of coronary heart disease, significant arrhythmia, peripheral arterial disease or other structural heart disease.    Revised Cardiac Risk Index (RCRI):  The patient has the following serious cardiovascular risks for perioperative complications:   - No serious cardiac risks = 0 points     RCRI Interpretation: 0 points: Class I (very low risk - 0.4% complication rate)    The likelihood of other entities in the differential is insufficient to justify any " further testing for them at this time. This was explained to the patient. The patient was advised that persistent or worsening symptoms would require further evaluation. Patient advised to call the office and if unable to reach to go to the emergency room if they develop any new or worsening symptoms. Expressed understanding and agreement with above stated plan.       Signed Electronically by: Gume Vigil PA-C  Copy of this evaluation report is provided to requesting physician.

## 2023-02-15 ENCOUNTER — HOSPITAL ENCOUNTER (OUTPATIENT)
Dept: ULTRASOUND IMAGING | Facility: CLINIC | Age: 43
Discharge: HOME | End: 2023-02-15
Attending: SURGERY
Payer: COMMERCIAL

## 2023-02-15 ENCOUNTER — OFFICE VISIT (OUTPATIENT)
Dept: FAMILY MEDICINE | Facility: CLINIC | Age: 43
End: 2023-02-15
Payer: COMMERCIAL

## 2023-02-15 ENCOUNTER — HOSPITAL ENCOUNTER (OUTPATIENT)
Dept: MAMMOGRAPHY | Facility: CLINIC | Age: 43
Discharge: HOME | End: 2023-02-15
Attending: SURGERY
Payer: COMMERCIAL

## 2023-02-15 VITALS
OXYGEN SATURATION: 100 % | TEMPERATURE: 98.6 F | DIASTOLIC BLOOD PRESSURE: 75 MMHG | SYSTOLIC BLOOD PRESSURE: 130 MMHG | HEART RATE: 92 BPM | HEIGHT: 66 IN | BODY MASS INDEX: 30.22 KG/M2 | WEIGHT: 188 LBS

## 2023-02-15 DIAGNOSIS — Z01.818 PRE-OP EXAM: Primary | ICD-10-CM

## 2023-02-15 DIAGNOSIS — D24.1 INTRADUCTAL PAPILLOMA OF RIGHT BREAST: ICD-10-CM

## 2023-02-15 LAB
ANION GAP SERPL CALCULATED.3IONS-SCNC: 11 MMOL/L (ref 7–15)
BASOPHILS # BLD AUTO: 0 10E3/UL (ref 0–0.2)
BASOPHILS NFR BLD AUTO: 1 %
BUN SERPL-MCNC: 8.2 MG/DL (ref 6–20)
CALCIUM SERPL-MCNC: 9.8 MG/DL (ref 8.6–10)
CHLORIDE SERPL-SCNC: 104 MMOL/L (ref 98–107)
CREAT SERPL-MCNC: 0.86 MG/DL (ref 0.51–0.95)
DEPRECATED HCO3 PLAS-SCNC: 25 MMOL/L (ref 22–29)
EOSINOPHIL # BLD AUTO: 0.1 10E3/UL (ref 0–0.7)
EOSINOPHIL NFR BLD AUTO: 1 %
ERYTHROCYTE [DISTWIDTH] IN BLOOD BY AUTOMATED COUNT: 12.7 % (ref 10–15)
GFR SERPL CREATININE-BSD FRML MDRD: 86 ML/MIN/1.73M2
GLUCOSE SERPL-MCNC: 90 MG/DL (ref 70–99)
HCT VFR BLD AUTO: 39.9 % (ref 35–47)
HGB BLD-MCNC: 12.6 G/DL (ref 11.7–15.7)
IMM GRANULOCYTES # BLD: 0 10E3/UL
IMM GRANULOCYTES NFR BLD: 0 %
LYMPHOCYTES # BLD AUTO: 1.5 10E3/UL (ref 0.8–5.3)
LYMPHOCYTES NFR BLD AUTO: 23 %
MCH RBC QN AUTO: 26.3 PG (ref 26.5–33)
MCHC RBC AUTO-ENTMCNC: 31.6 G/DL (ref 31.5–36.5)
MCV RBC AUTO: 83 FL (ref 78–100)
MONOCYTES # BLD AUTO: 0.6 10E3/UL (ref 0–1.3)
MONOCYTES NFR BLD AUTO: 10 %
NEUTROPHILS # BLD AUTO: 4.1 10E3/UL (ref 1.6–8.3)
NEUTROPHILS NFR BLD AUTO: 65 %
PLATELET # BLD AUTO: 311 10E3/UL (ref 150–450)
POTASSIUM SERPL-SCNC: 4.2 MMOL/L (ref 3.4–5.3)
RBC # BLD AUTO: 4.79 10E6/UL (ref 3.8–5.2)
SODIUM SERPL-SCNC: 140 MMOL/L (ref 136–145)
WBC # BLD AUTO: 6.3 10E3/UL (ref 4–11)

## 2023-02-15 PROCEDURE — 250N000009 HC RX 250: Performed by: RADIOLOGY

## 2023-02-15 PROCEDURE — 999N000065 MA POST PROCEDURE RIGHT

## 2023-02-15 PROCEDURE — 36415 COLL VENOUS BLD VENIPUNCTURE: CPT | Performed by: PHYSICIAN ASSISTANT

## 2023-02-15 PROCEDURE — 85025 COMPLETE CBC W/AUTO DIFF WBC: CPT | Performed by: PHYSICIAN ASSISTANT

## 2023-02-15 PROCEDURE — 19285 PERQ DEV BREAST 1ST US IMAG: CPT | Mod: RT

## 2023-02-15 PROCEDURE — 80048 BASIC METABOLIC PNL TOTAL CA: CPT | Performed by: PHYSICIAN ASSISTANT

## 2023-02-15 PROCEDURE — 99213 OFFICE O/P EST LOW 20 MIN: CPT | Performed by: PHYSICIAN ASSISTANT

## 2023-02-15 RX ADMIN — LIDOCAINE HYDROCHLORIDE 5 ML: 10 INJECTION, SOLUTION EPIDURAL; INFILTRATION; INTRACAUDAL; PERINEURAL at 07:48

## 2023-02-16 ENCOUNTER — HOSPITAL ENCOUNTER (OUTPATIENT)
Facility: CLINIC | Age: 43
Discharge: HOME | End: 2023-02-16
Attending: SURGERY | Admitting: SURGERY
Payer: COMMERCIAL

## 2023-02-16 ENCOUNTER — ANESTHESIA (OUTPATIENT)
Dept: SURGERY | Facility: CLINIC | Age: 43
End: 2023-02-16
Payer: COMMERCIAL

## 2023-02-16 ENCOUNTER — APPOINTMENT (OUTPATIENT)
Dept: SURGERY | Facility: PHYSICIAN GROUP | Age: 43
End: 2023-02-16
Payer: COMMERCIAL

## 2023-02-16 ENCOUNTER — HOSPITAL ENCOUNTER (OUTPATIENT)
Dept: MAMMOGRAPHY | Facility: CLINIC | Age: 43
Discharge: HOME | End: 2023-02-16
Attending: SURGERY
Payer: COMMERCIAL

## 2023-02-16 ENCOUNTER — ANESTHESIA EVENT (OUTPATIENT)
Dept: SURGERY | Facility: CLINIC | Age: 43
End: 2023-02-16
Payer: COMMERCIAL

## 2023-02-16 VITALS
RESPIRATION RATE: 15 BRPM | WEIGHT: 182 LBS | TEMPERATURE: 97.2 F | BODY MASS INDEX: 28.56 KG/M2 | HEIGHT: 67 IN | HEART RATE: 61 BPM | DIASTOLIC BLOOD PRESSURE: 75 MMHG | SYSTOLIC BLOOD PRESSURE: 118 MMHG | OXYGEN SATURATION: 100 %

## 2023-02-16 DIAGNOSIS — D24.1 INTRADUCTAL PAPILLOMA OF RIGHT BREAST: ICD-10-CM

## 2023-02-16 PROCEDURE — 250N000011 HC RX IP 250 OP 636: Performed by: SURGERY

## 2023-02-16 PROCEDURE — 710N000012 HC RECOVERY PHASE 2, PER MINUTE: Performed by: SURGERY

## 2023-02-16 PROCEDURE — 710N000009 HC RECOVERY PHASE 1, LEVEL 1, PER MIN: Performed by: SURGERY

## 2023-02-16 PROCEDURE — 88307 TISSUE EXAM BY PATHOLOGIST: CPT | Mod: TC | Performed by: SURGERY

## 2023-02-16 PROCEDURE — 370N000017 HC ANESTHESIA TECHNICAL FEE, PER MIN: Performed by: SURGERY

## 2023-02-16 PROCEDURE — 272N000001 HC OR GENERAL SUPPLY STERILE: Performed by: SURGERY

## 2023-02-16 PROCEDURE — 250N000025 HC SEVOFLURANE, PER MIN: Performed by: SURGERY

## 2023-02-16 PROCEDURE — 258N000003 HC RX IP 258 OP 636: Performed by: NURSE ANESTHETIST, CERTIFIED REGISTERED

## 2023-02-16 PROCEDURE — 250N000009 HC RX 250: Performed by: SURGERY

## 2023-02-16 PROCEDURE — 999N000141 HC STATISTIC PRE-PROCEDURE NURSING ASSESSMENT: Performed by: SURGERY

## 2023-02-16 PROCEDURE — 250N000011 HC RX IP 250 OP 636: Performed by: NURSE ANESTHETIST, CERTIFIED REGISTERED

## 2023-02-16 PROCEDURE — 88307 TISSUE EXAM BY PATHOLOGIST: CPT | Mod: 26 | Performed by: PATHOLOGY

## 2023-02-16 PROCEDURE — 19125 EXCISION BREAST LESION: CPT | Mod: RT | Performed by: SURGERY

## 2023-02-16 PROCEDURE — 999N000104 MA BREAST SPECIMEN RIGHT OR

## 2023-02-16 PROCEDURE — 250N000009 HC RX 250: Performed by: NURSE ANESTHETIST, CERTIFIED REGISTERED

## 2023-02-16 PROCEDURE — 360N000083 HC SURGERY LEVEL 3 W/ FLUORO, PER MIN: Performed by: SURGERY

## 2023-02-16 RX ORDER — HYDROMORPHONE HCL IN WATER/PF 6 MG/30 ML
0.4 PATIENT CONTROLLED ANALGESIA SYRINGE INTRAVENOUS EVERY 5 MIN PRN
Status: DISCONTINUED | OUTPATIENT
Start: 2023-02-16 | End: 2023-02-16 | Stop reason: HOSPADM

## 2023-02-16 RX ORDER — SODIUM CHLORIDE, SODIUM LACTATE, POTASSIUM CHLORIDE, CALCIUM CHLORIDE 600; 310; 30; 20 MG/100ML; MG/100ML; MG/100ML; MG/100ML
INJECTION, SOLUTION INTRAVENOUS CONTINUOUS
Status: CANCELLED | OUTPATIENT
Start: 2023-02-16

## 2023-02-16 RX ORDER — MAGNESIUM HYDROXIDE 1200 MG/15ML
LIQUID ORAL PRN
Status: DISCONTINUED | OUTPATIENT
Start: 2023-02-16 | End: 2023-02-16 | Stop reason: HOSPADM

## 2023-02-16 RX ORDER — HYDROCODONE BITARTRATE AND ACETAMINOPHEN 5; 325 MG/1; MG/1
1 TABLET ORAL
Status: DISCONTINUED | OUTPATIENT
Start: 2023-02-16 | End: 2023-02-16 | Stop reason: HOSPADM

## 2023-02-16 RX ORDER — DEXAMETHASONE SODIUM PHOSPHATE 4 MG/ML
INJECTION, SOLUTION INTRA-ARTICULAR; INTRALESIONAL; INTRAMUSCULAR; INTRAVENOUS; SOFT TISSUE PRN
Status: DISCONTINUED | OUTPATIENT
Start: 2023-02-16 | End: 2023-02-16

## 2023-02-16 RX ORDER — HYDROMORPHONE HCL IN WATER/PF 6 MG/30 ML
0.2 PATIENT CONTROLLED ANALGESIA SYRINGE INTRAVENOUS EVERY 5 MIN PRN
Status: DISCONTINUED | OUTPATIENT
Start: 2023-02-16 | End: 2023-02-16 | Stop reason: HOSPADM

## 2023-02-16 RX ORDER — OXYCODONE HYDROCHLORIDE 5 MG/1
10 TABLET ORAL EVERY 4 HOURS PRN
Status: DISCONTINUED | OUTPATIENT
Start: 2023-02-16 | End: 2023-02-16 | Stop reason: HOSPADM

## 2023-02-16 RX ORDER — HYDROCODONE BITARTRATE AND ACETAMINOPHEN 5; 325 MG/1; MG/1
1-2 TABLET ORAL EVERY 4 HOURS PRN
Qty: 10 TABLET | Refills: 0 | Status: SHIPPED | OUTPATIENT
Start: 2023-02-16 | End: 2023-03-29

## 2023-02-16 RX ORDER — KETOROLAC TROMETHAMINE 30 MG/ML
INJECTION, SOLUTION INTRAMUSCULAR; INTRAVENOUS PRN
Status: DISCONTINUED | OUTPATIENT
Start: 2023-02-16 | End: 2023-02-16

## 2023-02-16 RX ORDER — ONDANSETRON 2 MG/ML
INJECTION INTRAMUSCULAR; INTRAVENOUS PRN
Status: DISCONTINUED | OUTPATIENT
Start: 2023-02-16 | End: 2023-02-16

## 2023-02-16 RX ORDER — GLYCOPYRROLATE 0.2 MG/ML
INJECTION, SOLUTION INTRAMUSCULAR; INTRAVENOUS PRN
Status: DISCONTINUED | OUTPATIENT
Start: 2023-02-16 | End: 2023-02-16

## 2023-02-16 RX ORDER — SODIUM CHLORIDE, SODIUM LACTATE, POTASSIUM CHLORIDE, CALCIUM CHLORIDE 600; 310; 30; 20 MG/100ML; MG/100ML; MG/100ML; MG/100ML
INJECTION, SOLUTION INTRAVENOUS CONTINUOUS PRN
Status: DISCONTINUED | OUTPATIENT
Start: 2023-02-16 | End: 2023-02-16

## 2023-02-16 RX ORDER — CEFAZOLIN SODIUM/WATER 2 G/20 ML
2 SYRINGE (ML) INTRAVENOUS
Status: COMPLETED | OUTPATIENT
Start: 2023-02-16 | End: 2023-02-16

## 2023-02-16 RX ORDER — LIDOCAINE HYDROCHLORIDE 10 MG/ML
INJECTION, SOLUTION INFILTRATION; PERINEURAL PRN
Status: DISCONTINUED | OUTPATIENT
Start: 2023-02-16 | End: 2023-02-16

## 2023-02-16 RX ORDER — ONDANSETRON 2 MG/ML
4 INJECTION INTRAMUSCULAR; INTRAVENOUS EVERY 30 MIN PRN
Status: DISCONTINUED | OUTPATIENT
Start: 2023-02-16 | End: 2023-02-16 | Stop reason: HOSPADM

## 2023-02-16 RX ORDER — BUPIVACAINE HYDROCHLORIDE 5 MG/ML
INJECTION, SOLUTION EPIDURAL; INTRACAUDAL PRN
Status: DISCONTINUED | OUTPATIENT
Start: 2023-02-16 | End: 2023-02-16 | Stop reason: HOSPADM

## 2023-02-16 RX ORDER — LIDOCAINE 40 MG/G
CREAM TOPICAL
Status: CANCELLED | OUTPATIENT
Start: 2023-02-16

## 2023-02-16 RX ORDER — PROPOFOL 10 MG/ML
INJECTION, EMULSION INTRAVENOUS CONTINUOUS PRN
Status: DISCONTINUED | OUTPATIENT
Start: 2023-02-16 | End: 2023-02-16

## 2023-02-16 RX ORDER — SODIUM CHLORIDE, SODIUM LACTATE, POTASSIUM CHLORIDE, CALCIUM CHLORIDE 600; 310; 30; 20 MG/100ML; MG/100ML; MG/100ML; MG/100ML
INJECTION, SOLUTION INTRAVENOUS CONTINUOUS
Status: DISCONTINUED | OUTPATIENT
Start: 2023-02-16 | End: 2023-02-16 | Stop reason: HOSPADM

## 2023-02-16 RX ORDER — PROPOFOL 10 MG/ML
INJECTION, EMULSION INTRAVENOUS PRN
Status: DISCONTINUED | OUTPATIENT
Start: 2023-02-16 | End: 2023-02-16

## 2023-02-16 RX ORDER — FENTANYL CITRATE 50 UG/ML
25 INJECTION, SOLUTION INTRAMUSCULAR; INTRAVENOUS EVERY 5 MIN PRN
Status: DISCONTINUED | OUTPATIENT
Start: 2023-02-16 | End: 2023-02-16 | Stop reason: HOSPADM

## 2023-02-16 RX ORDER — ONDANSETRON 4 MG/1
4 TABLET, ORALLY DISINTEGRATING ORAL EVERY 30 MIN PRN
Status: DISCONTINUED | OUTPATIENT
Start: 2023-02-16 | End: 2023-02-16 | Stop reason: HOSPADM

## 2023-02-16 RX ORDER — FENTANYL CITRATE 50 UG/ML
INJECTION, SOLUTION INTRAMUSCULAR; INTRAVENOUS PRN
Status: DISCONTINUED | OUTPATIENT
Start: 2023-02-16 | End: 2023-02-16

## 2023-02-16 RX ORDER — OXYCODONE HYDROCHLORIDE 5 MG/1
5 TABLET ORAL EVERY 4 HOURS PRN
Status: DISCONTINUED | OUTPATIENT
Start: 2023-02-16 | End: 2023-02-16 | Stop reason: HOSPADM

## 2023-02-16 RX ORDER — FENTANYL CITRATE 50 UG/ML
50 INJECTION, SOLUTION INTRAMUSCULAR; INTRAVENOUS EVERY 5 MIN PRN
Status: DISCONTINUED | OUTPATIENT
Start: 2023-02-16 | End: 2023-02-16 | Stop reason: HOSPADM

## 2023-02-16 RX ORDER — CEFAZOLIN SODIUM/WATER 2 G/20 ML
2 SYRINGE (ML) INTRAVENOUS SEE ADMIN INSTRUCTIONS
Status: DISCONTINUED | OUTPATIENT
Start: 2023-02-16 | End: 2023-02-16 | Stop reason: HOSPADM

## 2023-02-16 RX ADMIN — SODIUM CHLORIDE, POTASSIUM CHLORIDE, SODIUM LACTATE AND CALCIUM CHLORIDE: 600; 310; 30; 20 INJECTION, SOLUTION INTRAVENOUS at 10:35

## 2023-02-16 RX ADMIN — GLYCOPYRROLATE 0.1 MG: 0.2 INJECTION, SOLUTION INTRAMUSCULAR; INTRAVENOUS at 10:03

## 2023-02-16 RX ADMIN — DEXAMETHASONE SODIUM PHOSPHATE 4 MG: 4 INJECTION, SOLUTION INTRA-ARTICULAR; INTRALESIONAL; INTRAMUSCULAR; INTRAVENOUS; SOFT TISSUE at 09:56

## 2023-02-16 RX ADMIN — FENTANYL CITRATE 100 MCG: 50 INJECTION, SOLUTION INTRAMUSCULAR; INTRAVENOUS at 09:56

## 2023-02-16 RX ADMIN — PHENYLEPHRINE HYDROCHLORIDE 50 MCG: 10 INJECTION INTRAVENOUS at 10:27

## 2023-02-16 RX ADMIN — ONDANSETRON 4 MG: 2 INJECTION INTRAMUSCULAR; INTRAVENOUS at 10:03

## 2023-02-16 RX ADMIN — LIDOCAINE HYDROCHLORIDE 50 MG: 10 INJECTION, SOLUTION INFILTRATION; PERINEURAL at 09:56

## 2023-02-16 RX ADMIN — MIDAZOLAM 2 MG: 1 INJECTION INTRAMUSCULAR; INTRAVENOUS at 09:50

## 2023-02-16 RX ADMIN — PROPOFOL 75 MCG/KG/MIN: 10 INJECTION, EMULSION INTRAVENOUS at 09:59

## 2023-02-16 RX ADMIN — FENTANYL CITRATE 50 MCG: 50 INJECTION, SOLUTION INTRAMUSCULAR; INTRAVENOUS at 10:03

## 2023-02-16 RX ADMIN — PROPOFOL 200 MG: 10 INJECTION, EMULSION INTRAVENOUS at 09:56

## 2023-02-16 RX ADMIN — SODIUM CHLORIDE, POTASSIUM CHLORIDE, SODIUM LACTATE AND CALCIUM CHLORIDE: 600; 310; 30; 20 INJECTION, SOLUTION INTRAVENOUS at 09:24

## 2023-02-16 RX ADMIN — FENTANYL CITRATE 50 MCG: 50 INJECTION, SOLUTION INTRAMUSCULAR; INTRAVENOUS at 10:10

## 2023-02-16 RX ADMIN — FENTANYL CITRATE 50 MCG: 50 INJECTION, SOLUTION INTRAMUSCULAR; INTRAVENOUS at 10:27

## 2023-02-16 RX ADMIN — Medication 2 G: at 09:50

## 2023-02-16 RX ADMIN — KETOROLAC TROMETHAMINE 30 MG: 30 INJECTION, SOLUTION INTRAMUSCULAR at 10:13

## 2023-02-16 ASSESSMENT — ACTIVITIES OF DAILY LIVING (ADL)
ADLS_ACUITY_SCORE: 35
ADLS_ACUITY_SCORE: 35

## 2023-02-16 ASSESSMENT — LIFESTYLE VARIABLES: TOBACCO_USE: 1

## 2023-02-16 NOTE — ANESTHESIA POSTPROCEDURE EVALUATION
Patient: Ruthy Cameron    Procedure: Procedure(s):  BIOPSY, BREAST, WITH RADIO FREQUENCY TAG LOCALIZATION right       Anesthesia Type:  General    Note:     Postop Pain Control: Uneventful            Sign Out: Well controlled pain   PONV: No   Neuro/Psych: Uneventful            Sign Out: Acceptable/Baseline neuro status   Airway/Respiratory: Uneventful            Sign Out: Acceptable/Baseline resp. status   CV/Hemodynamics: Uneventful            Sign Out: Acceptable CV status   Other NRE: NONE   DID A NON-ROUTINE EVENT OCCUR? No           Last vitals:  Vitals Value Taken Time   /77 02/16/23 1154   Temp 97.4  F (36.3  C) 02/16/23 1154   Pulse 52 02/16/23 1143   Resp 14 02/16/23 1143   SpO2 100 % 02/16/23 1154   Vitals shown include unvalidated device data.    Electronically Signed By: Figueroa Lee MD  February 16, 2023  1:17 PM

## 2023-02-16 NOTE — ANESTHESIA PROCEDURE NOTES
Airway       Patient location during procedure: OR  Staff -        Performed By: CRNA  Consent for Airway        Urgency: elective  Indications and Patient Condition       Indications for airway management: manuel-procedural       Induction type:intravenous       Mask difficulty assessment: 1 - vent by mask    Final Airway Details       Final airway type: supraglottic airway    Supraglottic Airway Details        Type: LMA       Brand: I-Gel       LMA size: 4    Post intubation assessment        Placement verified by: capnometry, equal breath sounds and chest rise        Number of attempts at approach: 1       Number of other approaches attempted: 0       Secured with: commercial tube parker       Ease of procedure: easy       Dentition: Intact and Unchanged

## 2023-02-16 NOTE — ANESTHESIA CARE TRANSFER NOTE
Patient: Ruthy Cameron    Procedure: Procedure(s):  BIOPSY, BREAST, WITH RADIO FREQUENCY TAG LOCALIZATION right       Diagnosis: Intraductal papilloma of right breast [D24.1]  Diagnosis Additional Information: No value filed.    Anesthesia Type:   General     Note:    Oropharynx: oral airway in place  Level of Consciousness: drowsy  Oxygen Supplementation: face mask  Level of Supplemental Oxygen (L/min / FiO2): 6  Independent Airway: airway patency satisfactory and stable  Dentition: dentition unchanged  Vital Signs Stable: post-procedure vital signs reviewed and stable  Report to RN Given: handoff report given  Patient transferred to: PACU    Handoff Report: Identifed the Patient, Identified the Reponsible Provider, Reviewed the pertinent medical history, Discussed the surgical course, Reviewed Intra-OP anesthesia mangement and issues during anesthesia, Set expectations for post-procedure period and Allowed opportunity for questions and acknowledgement of understanding      Vitals:  Vitals Value Taken Time   BP 95/53 02/16/23 1047   Temp 96.8  F (36  C) 02/16/23 1049   Pulse 60 02/16/23 1050   Resp 10 02/16/23 1050   SpO2 100 % 02/16/23 1050   Vitals shown include unvalidated device data.    Electronically Signed By: MINH Fields CRNA  February 16, 2023  10:51 AM

## 2023-02-16 NOTE — INTERVAL H&P NOTE
"I have reviewed the surgical (or preoperative) H&P that is linked to this encounter, and examined the patient. There are no significant changes    Clinical Conditions Present on Arrival:  Clinically Significant Risk Factors Present on Admission                    # Overweight: Estimated body mass index is 28.51 kg/m  as calculated from the following:    Height as of this encounter: 1.702 m (5' 7\").    Weight as of this encounter: 82.6 kg (182 lb).       "

## 2023-02-16 NOTE — ANESTHESIA PREPROCEDURE EVALUATION
Anesthesia Pre-Procedure Evaluation    Patient: Ruthy Cameron   MRN: [id number] : 1980        Procedure : Procedure(s):  BIOPSY, BREAST, WITH RADIO FREQUENCY TAG LOCALIZATION right          Past Medical History:   Diagnosis Date     SYD II (cervical intraepithelial neoplasia II) 98     Gastroesophageal reflux disease without esophagitis 2016     High risk HPV infection 13    normal pap     High risk HPV infection 5/6/15    colp 6/30/15 no high grade dysplasia     LSIL (low grade squamous intraepithelial lesion) on Pap smear 2014    + HR HPV 58.       Past Surgical History:   Procedure Laterality Date     EGD  2018     SOFT TISSUE SURGERY Left     Left breast lumpectomy     WISDOM TOOTH EXTRACTION      approx age 25      No Known Allergies   Social History     Tobacco Use     Smoking status: Some Days     Packs/day: 0.50     Types: Cigarettes     Smokeless tobacco: Never   Substance Use Topics     Alcohol use: Yes     Comment: Monthly      Wt Readings from Last 1 Encounters:   23 82.6 kg (182 lb)        Anesthesia Evaluation            ROS/MED HX  ENT/Pulmonary:     (+) tobacco use,     Neurologic:       Cardiovascular:       METS/Exercise Tolerance:     Hematologic:       Musculoskeletal:       GI/Hepatic:     (+) GERD (asymptomatic),     Renal/Genitourinary:       Endo:       Psychiatric/Substance Use:       Infectious Disease:       Malignancy:       Other:            Physical Exam    Airway        Mallampati: II   TM distance: > 3 FB   Neck ROM: full   Mouth opening: > 3 cm    Respiratory Devices and Support         Dental           Cardiovascular   cardiovascular exam normal          Pulmonary   pulmonary exam normal            Other findings: Lab Test        02/15/23     01/09/23     06/11/19                       1116          1503          1803          WBC          6.3          5.1          4.9           HGB          12.6         12.5         13.0          MCV          83            83           83            PLT          311          337          289            Lab Test        02/15/23     06/11/19     10/06/18     06/30/16                       1116          1803          0000          0942          NA           140          139           --          141           POTASSIUM    4.2          3.5          4.0          3.7           CHLORIDE     104          102           --          109           CO2          25           26            --          23            BUN          8.2          13            --          11            CR           0.86         0.89         0.86         0.63          ANIONGAP     11           11            --          9             COSMO          9.8          9.5           --          8.2*          GLC          90           61*          88           77              OUTSIDE LABS:  CBC:   Lab Results   Component Value Date    WBC 6.3 02/15/2023    WBC 5.1 01/09/2023    HGB 12.6 02/15/2023    HGB 12.5 01/09/2023    HCT 39.9 02/15/2023    HCT 37.9 01/09/2023     02/15/2023     01/09/2023     BMP:   Lab Results   Component Value Date     02/15/2023     06/11/2019    POTASSIUM 4.2 02/15/2023    POTASSIUM 3.5 06/11/2019    CHLORIDE 104 02/15/2023    CHLORIDE 102 06/11/2019    CO2 25 02/15/2023    CO2 26 06/11/2019    BUN 8.2 02/15/2023    BUN 13 06/11/2019    CR 0.86 02/15/2023    CR 0.89 06/11/2019    GLC 90 02/15/2023    GLC 61 (L) 06/11/2019     COAGS: No results found for: PTT, INR, FIBR  POC:   Lab Results   Component Value Date    BGM 49 (LL) 06/11/2019     HEPATIC:   Lab Results   Component Value Date    ALBUMIN 4.4 06/11/2019    PROTTOTAL 7.9 06/11/2019    ALT 24 06/11/2019    AST 14 06/11/2019    ALKPHOS 65 06/11/2019    BILITOTAL 0.7 06/11/2019     OTHER:   Lab Results   Component Value Date    COSMO 9.8 02/15/2023    LIPASE 136 06/11/2019    TSH 1.49 01/09/2023       Anesthesia Plan    ASA Status:  2      Anesthesia Type: General.     -  Airway: LMA   Induction: Propofol.   Maintenance: Balanced.        Consents    Anesthesia Plan(s) and associated risks, benefits, and realistic alternatives discussed. Questions answered and patient/representative(s) expressed understanding.    - Discussed:     - Discussed with:  Patient      - Extended Intubation/Ventilatory Support Discussed: No.      - Patient is DNR/DNI Status: No    Use of blood products discussed: No .     Postoperative Care    Pain management: IV analgesics, Oral pain medications.   PONV prophylaxis: Ondansetron (or other 5HT-3), Background Propofol Infusion     Comments:                Gurdeep Christianson MD

## 2023-02-16 NOTE — BRIEF OP NOTE
[facility]    Brief Operative Note    Pre-operative diagnosis: Intraductal papilloma of right breast [D24.1]  Post-operative diagnosis Right breast intraductal papilloma    Procedure: Procedure(s):  BIOPSY, BREAST, WITH RADIO FREQUENCY TAG LOCALIZATION right  Surgeon: Surgeon(s) and Role:     * Keny Dinh MD - Primary     * Grisel Barragan PA-C - Assisting  Anesthesia: General   Estimated Blood Loss: Less than 10 ml    Drains: None  Specimens:   ID Type Source Tests Collected by Time Destination   1 : Radiofrequency Localized Right Breast Biopsy Tissue Breast, Right SURGICAL PATHOLOGY EXAM Keny Dinh MD 2/16/2023 10:16 AM      Findings:   Specimen radiograph showed clip and tag, no gross findings.  Complications: None.  Implants: * No implants in log *

## 2023-02-16 NOTE — OP NOTE
Procedure Date: 02/16/2023    PREOPERATIVE DIAGNOSIS:  Intraductal papilloma of the right breast.    POSTOPERATIVE DIAGNOSIS:  Intraductal papilloma of the right breast.    PROCEDURE:  tagged localized right breast excisional biopsy.    ANESTHESIA:  General plus local.    SURGEON:  Keny Altamirano MD    ASSISTANT:  Grisel Barragan PA-C.  The physician assistant was medically necessary for her skills in suturing, cutting, suture exposure, traction, and suctioning throughout the operation.    SPECIMENS:  Right breast excisional biopsy for routine pathologic handling.    COMPLICATIONS:  None.    INDICATIONS FOR PROCEDURE:  Ms. Cameron is a 42-year-old female whom I met in my office recently who presented with a new diagnosis of a right breast intraductal papilloma.  This is in the context of intermittent spontaneous unilateral nipple discharge, which has been ongoing since early 2019.  She states that she had a ductogram in the past, which confirmed and examined as well. Because of this drainage as well as a pathologic diagnosis of a papilloma, I offered and recommended excisional biopsy.  Risks of procedure were discussed with her in detail.  These include infection, bleeding, harm to structures, need for additional sampling of tissue as well as the possibility of adjuvant treatment in the event of upgrade to malignant pathology.  The patient verbalized understanding of the above and consented to proceed.    FINDINGS:  We performed an excisional biopsy of the right breast at about the 10 o'clock position of the areola. Our specimen had no gross findings, but the radiograph contained the clip, tag and density.    DESCRIPTION OF PROCEDURE:  With the patient under excellent general anesthesia in supine position, the right breast was scanned with the RFID scanner and a jose was made at about 10 o'clock on the edge of the areolar skin.  The breast was subsequently prepped and draped out with chlorhexidine in the usual fashion.   A timeout was then performed confirming the patient, procedure to be done as well as drug allergies and site markings.  She did receive a dose of Ancef for infection prophylaxis prior to beginning our procedure.  We began by making a curvilinear incision at the edge of the areola at about 10-9 o'clock.  Electrocautery dissection was carried out into the subcutaneous tissues and thin dermal flaps were raised in all directions for about a centimeter.  This allowed us to place an extra, extra small Armani wound protector into the wound.  The lesion of concern was immediately deep to our incision and we then used electrocautery to circumferentially create an approximately a 2 cm specimen, again with the tag at the center of it.  We did encounter a small amount of gel, but did not visualize the clip itself.  Once entirely freed, the specimen was placed on a radiograph plate, the tag and clip were noted within the center of it.  This was confirmed by our staff radiologist, Dr. Vasquez.  We then irrigated, cauterized and closed the operative site with a 4-0 Vicryls and skin glue.  The patient tolerated the procedure well and she was extubated and brought to recovery in excellent condition.  All sharps and sponge counts were correct at the conclusion of the case.    Keny Campbell MD        D: 2023   T: 2023   MT: MARISELA    Name:     JARVIS STRINGER  MRN:      [id number]        Account:        [id number]   :      1980           Procedure Date: 2023     Document: [id number]    cc:  Jamison Enciso PA-C

## 2023-02-16 NOTE — DISCHARGE INSTRUCTIONS
HOME CARE FOLLOWING BREAST BIOPSY  DENNIS Woodruff, SHANDRA Lindsey C. Pratt, J. Shaheen    RESULTS:  You will receive a phone call from your surgeon or office staff with your pathology results.  Occasionally special testing must be done on the surgical specimen which may delay the posting of your final pathology report.  You may call for your final pathology report after 1p.m. three working days after surgery to check on its status.    INCISIONAL CARE:  If you have a dressing in place, keep clean and dry for 48 hours; you may replace the gauze if it becomes soiled.  After 48 hours you may remove the dressing and shower.  Do not submerse incision in water for 1 week.  If you have a Dermabond dressing (a type of skin glue), you may shower immediately.  Sutures will absorb and do not need to be removed.  If present, leave the steri-strips (white paper tapes) in place for 14 days after surgery.  If present, leave Dermabond glue in place until it wears/flakes off.  You may expect a small amount of drainage from your incision.  A lump/ridge under the incision is normal and will gradually resolve.    SUPPORT:  Wear a bra for support and comfort for 3-7 days, day and night.    ACTIVITY:  Cautiously resume exercise and strenuous activities such as jogging, tennis, aerobics, etc. Also, be careful of stretching activities with operative side for two weeks.    DIET:  Start with liquids and gradually resume your regular diet as tolerated.  Increased fluid intake is recommended. While taking pain medications, consider use of a stool softener, increase your fiber in your diet, or add a fiber supplement (like Metamucil, Citrucel) to help prevent constipation - a possible side effect of pain medications.    DISCOMFORT:  Local anesthetic placed at surgery should provide relief for 4-8 hours.  Begin taking pain pills before discomfort is severe.  Take the pain medication with some food, when possible, to minimize  side effects.  Intermittent use of ice packs may help during the first 1-3 weeks after surgery.  Expect gradual improvement.    Over-the-counter anti-inflammatory medications (i.e. Ibuprofen/Advil/Motrin or Naprosyn/Aleve) may be used per package instructions in addition to or while tapering off the narcotic pain medications to decrease swelling and sensitivity.  DO NOT TAKE these Anti-inflammatory medications if your primary physician has advised against doing so, or if you have acid reflux, ulcer, or bleeding disorder, or take blood-thinner medications.  Call your primary physician or the surgery office if you have medication questions.      FOLLOW-UP AFTER SURGERY:  -Our office will contact you approximately 2-3 weeks after surgery to check on your progress and answer any questions you may have.  If you are doing well, you will not need to return for an office appointment.  If any concerns are identified over the phone, we will help you make an appointment to see a provider.    -If you have not received a phone call, have any questions or concerns, or would like to be seen, please call us at [phone].  We are located at: [address], [address]; [place] [address]    -CONTACT US IF THE FOLLOWING DEVELOPS:   1. A fever that is above 101     2. Increased redness, warmth, drainage, bleeding, or swelling.   3. Pain that is not relieved by rest/ice and your prescription.   4.  Increasing pain after 48 hours.   5. Drainage that is thick, cloudy, yellow, green or white.   6. Any other questions or concerns.      FREQUENTLY ASKED QUESTIONS:    Q:  How should my incision look?    A:  Normally your incision will appear slightly swollen with light redness directly along the incision itself as it heals.  It may feel like a bump or ridge as the healing/scarring happens, and over time (3-4 months) this bump or ridge feeling should slowly go away.  In general, clear or pink watery drainage can be normal at first as  your incision heals, but should decrease over time.    Q:  How do I know if my incision is infected?  A:  Look at your incision for signs of infection, like redness around the incision spreading to surrounding skin, or drainage of cloudy or foul-smelling drainage.  If you feel warm, check your temperature to see if you are running a fever.    **If any of these things occur, please notify the nurse at our office.  We may need you to come into the office for an incision check.      Q:  How do I take care of my incision?  A:  If you have a dressing in place - Starting the day after surgery, replace the dressing 1-2 times a day until there is no further drainage from the incision.  At that time, a dressing is no longer needed.  Try to minimize tape on the skin if irritation is occurring at the tape sites.  If you have significant irritation from tape on the skin, please call the office to discuss other method of dressing your incision.    Small pieces of tape called  steri-strips  may be present directly overlying your incision; these may be removed 10 days after surgery unless otherwise specified by your surgeon.  If these tapes start to loosen at the ends, you may trim them back until they fall off or are removed.    A:  If you had  Dermabond  tissue glue used as a dressing (this causes your incision to look shiny with a clear covering over it) - This type of dressing wears off with time and does not require more dressings over the top unless it is draining around the glue as it wears off.  Do not apply ointments or lotions over the incisions until the glue has completely worn off.    Q:  There is a piece of tape or a sticky  lead  still on my skin.  Can I remove this?  A:  Sometimes the sticky  leads  used for monitoring during surgery or for evaluation in the emergency department are not all removed while you are in the hospital.  These sometimes have a tab or metal dot on them.  You can easily remove these on your  own, like taking off a band-aid.  If there is a gel substance under the  lead , simply wipe/clean it off with a washcloth or paper towel.      Q:  What can I do to minimize constipation (very hard stools, or lack of stools)?  A:  Stay well hydrated.  Increase your dietary fiber intake or take a fiber supplement -with plenty of water.  Walk around frequently.  You may consider an over-the-counter stool-softener.  Your Pharmacist can assist you with choosing one that is stocked at your pharmacy.  Constipation is also one of the most common side effects of pain medication.  If you are using pain medication, be pro-active and try to PREVENT problems with constipation by taking the steps above BEFORE constipation becomes a problem.    Q:  What do I do if I need more pain medications?  A:  Call the office to receive refills.  Be aware that certain pain meds cannot be called into a pharmacy and actually require a paper prescription.  A change may be made in your pain med as you progress thru your recovery period or if you have side effects to certain meds.    --Pain meds are NOT refilled after 5pm on weekdays, and NOT AT ALL on the weekends, so please look ahead to prevent problems.    Q:  Why am I having a hard time sleeping now that I am at home?  A:  Many medications you receive while you are in the hospital can impact your sleep for a number of days after your surgery/hospitalization.  Decreased level of activity and naps during the day may also make sleeping at night difficult.  Try to minimize day-time naps, and get up frequently during the day to walk around your home during your recovery time.  Sleep aides may be of some help, but are not recommended for long-term use.      Q:  I am having some back discomfort.  What should I do?  A:  This may be related to certain positioning that was required for your surgery, extended periods of time in bed, or other changes in your overall activity level.  You may try ice,  heat, acetaminophen, or ibuprofen to treat this temporarily.  Note that many pain medications have acetaminophen in them and would state this on the prescription bottle.  Be sure not to exceed the maximum of 4000mg per day of acetaminophen.     **If the pain you are having does not resolve, is severe, or is a flare of back pain you have had on other occasions prior to surgery, please contact your primary physician for further recommendations or for an appointment to be examined at their office.    Q:  Why am I having headaches?  A:  Headaches can be caused by many things:  caffeine withdrawal, use of pain meds, dehydration, high blood pressure, lack of sleep, over-activity/exhaustion, flare-up of usual migraine headaches.  If you feel this is related to muscle tension (a band-like feeling around the head, or a pressure at the low-back of the head) you may try ice or heat to this area.  You may need to drink more fluids (try electrolyte drink like Gatorade), rest, or take your usual migraine medications.   **If your headaches do not resolve, worsen, are accompanied by other symptoms, or if your blood pressure is high, please call your primary physician for recommendation and/or examination.    Q:  I am unable to urinate.  What do I do?  A:  A small percentage of people can have difficulty urinating initially after surgery.  This includes being able to urinate only a very small amount at a time and feeling discomfort or pressure in the very low abdomen.  This is called  urinary retention , and is actually an urgent situation.  Proceed to your nearest Emergency department for evaluation (not an Urgent Care Center).  Sometimes the bladder does not work correctly after certain medications you receive during surgery, or related to certain procedures.  You may need to have a catheter placed until your bladder recovers.  When planning to go to an Emergency department, it may help to call the ER to let them know you are  coming in for this problem after a surgery.  This may help you get in quicker to be evaluated.  **If you have symptoms of a urinary tract infection, please contact your primary physician for the proper evaluation and treatment.        If you have other questions, please call the office Monday thru Friday between 8am and 4:30pm to discuss with the nurse or physician assistant.  #[phone]    There is a surgeon ON CALL on weekday evenings and over the weekend in case of urgent need only, and may be contacted at the same number.    If you are having an emergency, call 911 or proceed to your nearest emergency department.    GENERAL ANESTHESIA OR SEDATION ADULT DISCHARGE INSTRUCTIONS   SPECIAL PRECAUTIONS FOR 24 HOURS AFTER SURGERY    IT IS NOT UNUSUAL TO FEEL LIGHT-HEADED OR FAINT, UP TO 24 HOURS AFTER SURGERY OR WHILE TAKING PAIN MEDICATION.  IF YOU HAVE THESE SYMPTOMS; SIT FOR A FEW MINUTES BEFORE STANDING AND HAVE SOMEONE ASSIST YOU WHEN YOU GET UP TO WALK OR USE THE BATHROOM.    YOU SHOULD REST AND RELAX FOR THE NEXT 24 HOURS AND YOU MUST MAKE ARRANGEMENTS TO HAVE SOMEONE STAY WITH YOU FOR AT LEAST 24 HOURS AFTER YOUR DISCHARGE.  AVOID HAZARDOUS AND STRENUOUS ACTIVITIES.  DO NOT MAKE IMPORTANT DECISIONS FOR 24 HOURS.    DO NOT DRIVE ANY VEHICLE OR OPERATE MECHANICAL EQUIPMENT FOR 24 HOURS FOLLOWING THE END OF YOUR SURGERY.  EVEN THOUGH YOU MAY FEEL NORMAL, YOUR REACTIONS MAY BE AFFECTED BY THE MEDICATION YOU HAVE RECEIVED.    DO NOT DRINK ALCOHOLIC BEVERAGES FOR 24 HOURS FOLLOWING YOUR SURGERY.    DRINK CLEAR LIQUIDS (APPLE JUICE, GINGER ALE, 7-UP, BROTH, ETC.).  PROGRESS TO YOUR REGULAR DIET AS YOU FEEL ABLE.    YOU MAY HAVE A DRY MOUTH, A SORE THROAT, MUSCLES ACHES OR TROUBLE SLEEPING.  THESE SHOULD GO AWAY AFTER 24 HOURS.    CALL YOUR DOCTOR FOR ANY OF THE FOLLOWING:  SIGNS OF INFECTION (FEVER, GROWING TENDERNESS AT THE SURGERY SITE, A LARGE AMOUNT OF DRAINAGE OR BLEEDING, SEVERE PAIN, FOUL-SMELLING  DRAINAGE, REDNESS OR SWELLING.    IT HAS BEEN OVER 8 TO 10 HOURS SINCE SURGERY AND YOU ARE STILL NOT ABLE TO URINATE (PASS WATER).      Dr. Dinh  [facility] [phone]     You received Toradol, an IV form of Ibuprofen (Motrin) at 10:13 AM.  Do not take any Ibuprofen products until 06:20 PM.

## 2023-02-16 NOTE — LETTER
[facility] PREOP/POSTOP  [address]  [facility] [address]  Phone: [phone]  Fax: [phone]    February 16, 2023        Ruthy Cameron  [address] [place] [address] [address] [place] [place] [address]          To whom it may concern:    RE: Ruthynell Bliss received anesthesia on 2/16/23.  According to anesthesia guidelines, she may not drive for 24 hours following anesthesia.  She may return to work on 2/17/23 after 12pm.          Sincerely,        Antonieta Edmonds RN

## 2023-02-20 LAB
PATH REPORT.COMMENTS IMP SPEC: NORMAL
PATH REPORT.COMMENTS IMP SPEC: NORMAL
PATH REPORT.FINAL DX SPEC: NORMAL
PATH REPORT.GROSS SPEC: NORMAL
PATH REPORT.MICROSCOPIC SPEC OTHER STN: NORMAL
PATH REPORT.RELEVANT HX SPEC: NORMAL
PHOTO IMAGE: NORMAL

## 2023-02-21 NOTE — RESULT ENCOUNTER NOTE
Patient was phoned by me with result.  Papilloma, no atypia.  She reports feeling and healing well.

## 2023-03-09 ENCOUNTER — TELEPHONE (OUTPATIENT)
Dept: SURGERY | Facility: CLINIC | Age: 43
End: 2023-03-09
Payer: COMMERCIAL

## 2023-03-09 NOTE — TELEPHONE ENCOUNTER
[facility]  Post op call note     Ruthy Cameron was called for an update regarding her recovery.  She underwent right breast excisional biopsy by Dr. Altamirano on 2/16/2023. Today she tells me she is doing well and denies any complaints. She reports a firmness beneath the incision but this has been decreasing in size.    She was instructed to slowly and gradually resume all normal activities. She states all of her questions were answered.  She understands our discussion.  She agrees to follow up as needed or to call our office with any concerns.    Grisel Barragan PA-C

## 2023-03-28 ASSESSMENT — ANXIETY QUESTIONNAIRES
1. FEELING NERVOUS, ANXIOUS, OR ON EDGE: NEARLY EVERY DAY
7. FEELING AFRAID AS IF SOMETHING AWFUL MIGHT HAPPEN: NEARLY EVERY DAY
8. IF YOU CHECKED OFF ANY PROBLEMS, HOW DIFFICULT HAVE THESE MADE IT FOR YOU TO DO YOUR WORK, TAKE CARE OF THINGS AT HOME, OR GET ALONG WITH OTHER PEOPLE?: VERY DIFFICULT
6. BECOMING EASILY ANNOYED OR IRRITABLE: NOT AT ALL
IF YOU CHECKED OFF ANY PROBLEMS ON THIS QUESTIONNAIRE, HOW DIFFICULT HAVE THESE PROBLEMS MADE IT FOR YOU TO DO YOUR WORK, TAKE CARE OF THINGS AT HOME, OR GET ALONG WITH OTHER PEOPLE: VERY DIFFICULT
GAD7 TOTAL SCORE: 14
GAD7 TOTAL SCORE: 14
3. WORRYING TOO MUCH ABOUT DIFFERENT THINGS: MORE THAN HALF THE DAYS
5. BEING SO RESTLESS THAT IT IS HARD TO SIT STILL: NOT AT ALL
7. FEELING AFRAID AS IF SOMETHING AWFUL MIGHT HAPPEN: NEARLY EVERY DAY
2. NOT BEING ABLE TO STOP OR CONTROL WORRYING: NEARLY EVERY DAY
4. TROUBLE RELAXING: NEARLY EVERY DAY
GAD7 TOTAL SCORE: 14

## 2023-03-29 ENCOUNTER — VIRTUAL VISIT (OUTPATIENT)
Dept: FAMILY MEDICINE | Facility: CLINIC | Age: 43
End: 2023-03-29
Payer: COMMERCIAL

## 2023-03-29 DIAGNOSIS — F51.04 PSYCHOPHYSIOLOGICAL INSOMNIA: Primary | ICD-10-CM

## 2023-03-29 PROCEDURE — 99213 OFFICE O/P EST LOW 20 MIN: CPT | Mod: VID | Performed by: PHYSICIAN ASSISTANT

## 2023-03-29 RX ORDER — TRAZODONE HYDROCHLORIDE 50 MG/1
50-100 TABLET, FILM COATED ORAL
Qty: 60 TABLET | Refills: 1 | Status: SHIPPED | OUTPATIENT
Start: 2023-03-29 | End: 2023-06-12

## 2023-03-29 ASSESSMENT — ANXIETY QUESTIONNAIRES: GAD7 TOTAL SCORE: 14

## 2023-03-29 NOTE — PROGRESS NOTES
"Ruthy is a 42 year old who is being evaluated via a billable video visit.      How would you like to obtain your AVS? MyChart  If the video visit is dropped, the invitation should be resent by: Text to cell phone: [phone]  Will anyone else be joining your video visit? No          Assessment & Plan     Psychophysiological insomnia  Insomnia due to previous incident with bed bugs.   Discussed trazodone and it may cause a hangover drowsiness.   Patient will try and message if not working.   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                Nicotine/Tobacco Cessation:  She reports that she has been smoking cigarettes. She has been smoking an average of .5 packs per day. She has never used smokeless tobacco.  Nicotine/Tobacco Cessation Plan:   Information offered: Patient not interested at this time      BMI:   Estimated body mass index is 28.51 kg/m  as calculated from the following:    Height as of 2/16/23: 1.702 m (5' 7\").    Weight as of 2/16/23: 82.6 kg (182 lb).           Elinor Dickey PA-C  [facility]    Sarai Bliss is a 42 year old, presenting for the following health issues:  Patient Inquiry and Health Maintenance    Additional Questions 3/29/2023   Roomed by kodi razo     History of Present Illness       Mental Health Follow-up:  Patient presents to follow-up on Anxiety.    Patient's anxiety since last visit has been:  Medium  The patient is having other symptoms associated with anxiety.  Any significant life events: other  Patient is feeling anxious or having panic attacks.  Patient has no concerns about alcohol or drug use.    Reason for visit:  Anxiety regarding bed bug incident    She eats 2-3 servings of fruits and vegetables daily.She consumes 1 sweetened beverage(s) daily.She exercises with enough effort to increase her heart rate 20 to 29 minutes per day.  She exercises with enough effort to increase her heart rate 3 or less days per week.   She " is taking medications regularly.  Today's OLENA-7 Score: 14     Patient drives trucks. Trades trucks  She had an incident of bed bugs. Now not able to sleep and and can't drive.             Review of Systems         Objective           Vitals:  No vitals were obtained today due to virtual visit.    Physical Exam   GENERAL: Healthy, alert and no distress  EYES: Eyes grossly normal to inspection.  No discharge or erythema, or obvious scleral/conjunctival abnormalities.  RESP: No audible wheeze, cough, or visible cyanosis.  No visible retractions or increased work of breathing.    SKIN: Visible skin clear. No significant rash, abnormal pigmentation or lesions.  NEURO: Cranial nerves grossly intact.  Mentation and speech appropriate for age.  PSYCH: Mentation appears normal, affect normal/bright, judgement and insight intact, normal speech and appearance well-groomed.                Video-Visit Details    Type of service:  Video Visit   Video Start Time: 1113  Video End Time:1123    Originating Location (pt. Location): Home    Distant Location (provider location):  On-site  Platform used for Video Visit: Sameer

## 2023-06-08 ASSESSMENT — ANXIETY QUESTIONNAIRES
GAD7 TOTAL SCORE: 14
GAD7 TOTAL SCORE: 14
IF YOU CHECKED OFF ANY PROBLEMS ON THIS QUESTIONNAIRE, HOW DIFFICULT HAVE THESE PROBLEMS MADE IT FOR YOU TO DO YOUR WORK, TAKE CARE OF THINGS AT HOME, OR GET ALONG WITH OTHER PEOPLE: VERY DIFFICULT
7. FEELING AFRAID AS IF SOMETHING AWFUL MIGHT HAPPEN: MORE THAN HALF THE DAYS
2. NOT BEING ABLE TO STOP OR CONTROL WORRYING: MORE THAN HALF THE DAYS
7. FEELING AFRAID AS IF SOMETHING AWFUL MIGHT HAPPEN: MORE THAN HALF THE DAYS
3. WORRYING TOO MUCH ABOUT DIFFERENT THINGS: MORE THAN HALF THE DAYS
6. BECOMING EASILY ANNOYED OR IRRITABLE: SEVERAL DAYS
5. BEING SO RESTLESS THAT IT IS HARD TO SIT STILL: MORE THAN HALF THE DAYS
GAD7 TOTAL SCORE: 14
8. IF YOU CHECKED OFF ANY PROBLEMS, HOW DIFFICULT HAVE THESE MADE IT FOR YOU TO DO YOUR WORK, TAKE CARE OF THINGS AT HOME, OR GET ALONG WITH OTHER PEOPLE?: VERY DIFFICULT
5. BEING SO RESTLESS THAT IT IS HARD TO SIT STILL: MORE THAN HALF THE DAYS
3. WORRYING TOO MUCH ABOUT DIFFERENT THINGS: MORE THAN HALF THE DAYS
2. NOT BEING ABLE TO STOP OR CONTROL WORRYING: MORE THAN HALF THE DAYS
7. FEELING AFRAID AS IF SOMETHING AWFUL MIGHT HAPPEN: MORE THAN HALF THE DAYS
IF YOU CHECKED OFF ANY PROBLEMS ON THIS QUESTIONNAIRE, HOW DIFFICULT HAVE THESE PROBLEMS MADE IT FOR YOU TO DO YOUR WORK, TAKE CARE OF THINGS AT HOME, OR GET ALONG WITH OTHER PEOPLE: VERY DIFFICULT
1. FEELING NERVOUS, ANXIOUS, OR ON EDGE: MORE THAN HALF THE DAYS
4. TROUBLE RELAXING: NEARLY EVERY DAY
GAD7 TOTAL SCORE: 14
7. FEELING AFRAID AS IF SOMETHING AWFUL MIGHT HAPPEN: MORE THAN HALF THE DAYS
8. IF YOU CHECKED OFF ANY PROBLEMS, HOW DIFFICULT HAVE THESE MADE IT FOR YOU TO DO YOUR WORK, TAKE CARE OF THINGS AT HOME, OR GET ALONG WITH OTHER PEOPLE?: VERY DIFFICULT
6. BECOMING EASILY ANNOYED OR IRRITABLE: SEVERAL DAYS
1. FEELING NERVOUS, ANXIOUS, OR ON EDGE: MORE THAN HALF THE DAYS
4. TROUBLE RELAXING: NEARLY EVERY DAY

## 2023-06-12 ENCOUNTER — VIRTUAL VISIT (OUTPATIENT)
Dept: FAMILY MEDICINE | Facility: CLINIC | Age: 43
End: 2023-06-12
Payer: COMMERCIAL

## 2023-06-12 DIAGNOSIS — F41.9 ANXIETY: Primary | ICD-10-CM

## 2023-06-12 DIAGNOSIS — F51.04 PSYCHOPHYSIOLOGICAL INSOMNIA: ICD-10-CM

## 2023-06-12 PROCEDURE — 99441 PR PHYSICIAN TELEPHONE EVALUATION 5-10 MIN: CPT | Mod: 95 | Performed by: PHYSICIAN ASSISTANT

## 2023-06-12 RX ORDER — TRAZODONE HYDROCHLORIDE 50 MG/1
50-150 TABLET, FILM COATED ORAL
Qty: 60 TABLET | Refills: 1
Start: 2023-06-12 | End: 2023-08-15

## 2023-06-12 ASSESSMENT — ENCOUNTER SYMPTOMS: NERVOUS/ANXIOUS: 1

## 2023-06-12 ASSESSMENT — ANXIETY QUESTIONNAIRES: GAD7 TOTAL SCORE: 14

## 2023-06-12 NOTE — PROGRESS NOTES
"Ruthy is a 42 year old who is being evaluated via a billable telephone visit.      What phone number would you like to be contacted at? [phone]  How would you like to obtain your AVS? "[employer]hart    Distant Location (provider location):  On-site    Assessment & Plan     Anxiety  Will start lexapro tonight, check in via [employer] in 3-4 weeks.   - Adult Mental Health  Referral; Future    Psychophysiological insomnia  Ok to increase to 150mg nightly if needed prn.   - traZODone (DESYREL) 50 MG tablet; Take 1-3 tablets ( mg) by mouth nightly as needed for sleep             BMI:   Estimated body mass index is 28.51 kg/m  as calculated from the following:    Height as of 2/16/23: 1.702 m (5' 7\").    Weight as of 2/16/23: 82.6 kg (182 lb).           Elinor Dickey PA-C  [facility]    Sarai Bliss is a 42 year old, presenting for the following health issues:  Depression and Anxiety        6/12/2023     4:43 PM   Additional Questions   Roomed by kodi razo     Anxiety    History of Present Illness       Mental Health Follow-up:  Patient presents to follow-up on Anxiety.    Patient's anxiety since last visit has been:  Worse  The patient is having other symptoms associated with anxiety.  Any significant life events: other  Patient is feeling anxious or having panic attacks.  Patient has no concerns about alcohol or drug use.    She eats 4 or more servings of fruits and vegetables daily.She consumes 1 sweetened beverage(s) daily.She exercises with enough effort to increase her heart rate 30 to 60 minutes per day.  She exercises with enough effort to increase her heart rate 4 days per week.   She is taking medications regularly.  Today's OLENA-7 Score: 14     Sleeping meds worked for a minute but then they didn't.   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   Trazodone was only giving her a couple of hours of sleep.     She picked up the prescription of the lexapro. Has " not started it yet.           Review of Systems   Psychiatric/Behavioral: The patient is nervous/anxious.             Objective           Vitals:  No vitals were obtained today due to virtual visit.    Physical Exam   healthy, alert and no distress  PSYCH: Alert and oriented times 3; coherent speech, normal   rate and volume, able to articulate logical thoughts, able   to abstract reason, no tangential thoughts, no hallucinations   or delusions  Her affect is normal  RESP: No cough, no audible wheezing, able to talk in full sentences  Remainder of exam unable to be completed due to telephone visits                Phone call duration: 10 minutes

## 2023-06-15 ENCOUNTER — OFFICE VISIT (OUTPATIENT)
Dept: FAMILY MEDICINE | Facility: CLINIC | Age: 43
End: 2023-06-15
Payer: COMMERCIAL

## 2023-06-15 VITALS
RESPIRATION RATE: 18 BRPM | OXYGEN SATURATION: 100 % | WEIGHT: 181 LBS | TEMPERATURE: 97.7 F | DIASTOLIC BLOOD PRESSURE: 74 MMHG | BODY MASS INDEX: 29.09 KG/M2 | SYSTOLIC BLOOD PRESSURE: 122 MMHG | HEART RATE: 95 BPM | HEIGHT: 66 IN

## 2023-06-15 DIAGNOSIS — Z12.4 CERVICAL CANCER SCREENING: ICD-10-CM

## 2023-06-15 DIAGNOSIS — N92.1 MENORRHAGIA WITH IRREGULAR CYCLE: Primary | ICD-10-CM

## 2023-06-15 PROCEDURE — 99213 OFFICE O/P EST LOW 20 MIN: CPT | Performed by: NURSE PRACTITIONER

## 2023-06-15 PROCEDURE — G0145 SCR C/V CYTO,THINLAYER,RESCR: HCPCS | Performed by: NURSE PRACTITIONER

## 2023-06-15 PROCEDURE — 87624 HPV HI-RISK TYP POOLED RSLT: CPT | Performed by: NURSE PRACTITIONER

## 2023-06-15 NOTE — PROGRESS NOTES
"  Assessment & Plan     Ruthy was seen today for menstrual concerns.    Diagnoses and all orders for this visit:    Menorrhagia with irregular cycle  Discussed recheck of hemoglobin at today's visit, pt declined - was normal in Feburary 2023.    Will plan to proceed with pelvic ultrasound for further evaluation.    -     US Pelvic Complete with Transvaginal; Future    Cervical cancer screening  Last pap smear completed in August 2016, NIL, neg HPV.  Due for repeat screening.    -     Pap Screen with HPV - recommended age 30 - 65 years       BMI:   Estimated body mass index is 29.21 kg/m  as calculated from the following:    Height as of this encounter: 1.676 m (5' 6\").    Weight as of this encounter: 82.1 kg (181 lb).     Return in about 6 months (around 12/15/2023) for Preventative Visit/Fasting labs.    Cadence Gavin, MINH [facility] PRIOR KALEN Bliss is a 42 year old, presenting for the following health issues:  Menstrual Concerns        6/15/2023     9:53 AM   Additional Questions   Roomed by Esther Barrios MA       Menstrual Concern    Patient reports clotting with menstrual cycles.   Regular cycles.  Noticed ongoing clots for the past 7-8 months, heavier bleeding is lasting longer for the first 3 days.   Cramps associated with day 2 of menses, this hasn't changed.      Onset/Duration: 2 months ago   Description:   Duration of bleeding episodes: 5-6 days  Frequency of periods: (1st day of one to 1st day of next):  every 21 days  Describe bleeding/flow:   Clots: YES- first 3-4 days   Number of pads/day: Depends on the flow maybe 10 though        Cramping: severe and during  Accompanying Signs & Symptoms:  Lightheadedness: No  Temperature intolerance: No  Nosebleeds/Easy bruising: No  Vaginal Discharge: No  Acne: No  Change in body hair: No  History:  Patient's last menstrual period was 05/21/2023.  Previous normal periods: YES  Contraceptive use: NO  Sexually active: " "No  Any bleeding after intercourse: N/A  Abnormal PAP Smears: YES  Precipitating or alleviating factors: None  Therapies tried and outcome: Naproxen for the cramps, heating pad       Lives in [place], moved there in November.  Works as a  for a distribution center.   Has a fiance.    Two children, ages 25 and 15 (lives with mother in [place]).    Mother recently diagnosed with lung cancer.     Review of Systems     Constitutional, HEENT, cardiovascular, pulmonary, gi and gu systems are negative, except as otherwise noted.      Objective    /74   Pulse 95   Temp 97.7  F (36.5  C)   Resp 18   Ht 1.676 m (5' 6\")   Wt 82.1 kg (181 lb)   LMP 05/21/2023   SpO2 100%   BMI 29.21 kg/m    Body mass index is 29.21 kg/m .     Physical Exam     GENERAL: healthy, alert and no distress  RESP: lungs clear to auscultation - no rales, rhonchi or wheezes  CV: regular rate and rhythm, normal S1 S2, no S3 or S4, no murmur   (female): normal female external genitalia, normal urethral meatus, vaginal mucosa pink, moist, well rugated, and normal cervix/adnexa/uterus without masses or discharge  PSYCH: mentation appears normal, affect normal/bright                "

## 2023-06-19 ENCOUNTER — VIRTUAL VISIT (OUTPATIENT)
Dept: PSYCHOLOGY | Facility: CLINIC | Age: 43
End: 2023-06-19
Attending: PHYSICIAN ASSISTANT
Payer: COMMERCIAL

## 2023-06-19 DIAGNOSIS — Z53.9 ERRONEOUS ENCOUNTER--DISREGARD: Primary | ICD-10-CM

## 2023-06-19 LAB
BKR LAB AP GYN ADEQUACY: NORMAL
BKR LAB AP GYN INTERPRETATION: NORMAL
BKR LAB AP HPV REFLEX: NORMAL
BKR LAB AP PREVIOUS ABNORMAL: NORMAL
PATH REPORT.COMMENTS IMP SPEC: NORMAL
PATH REPORT.COMMENTS IMP SPEC: NORMAL
PATH REPORT.RELEVANT HX SPEC: NORMAL

## 2023-06-19 NOTE — PROGRESS NOTES
Patient did not enter the video visit today. I did send an invite again at 403pm, and then called at 410pm. Patient answered and said they had gotten busy with something at work and would not make the appointment today. They stated that they have the 1-800 number to call and reschedule and will do that when they feel ready to set up another time.    Bayron Schroeder MA, LMFT

## 2023-06-20 ENCOUNTER — HOSPITAL ENCOUNTER (OUTPATIENT)
Dept: ULTRASOUND IMAGING | Facility: CLINIC | Age: 43
Discharge: HOME | End: 2023-06-20
Attending: NURSE PRACTITIONER | Admitting: NURSE PRACTITIONER
Payer: COMMERCIAL

## 2023-06-20 DIAGNOSIS — N92.1 MENORRHAGIA WITH IRREGULAR CYCLE: ICD-10-CM

## 2023-06-20 PROCEDURE — 76830 TRANSVAGINAL US NON-OB: CPT

## 2023-06-21 LAB
HUMAN PAPILLOMA VIRUS 16 DNA: NEGATIVE
HUMAN PAPILLOMA VIRUS 18 DNA: NEGATIVE
HUMAN PAPILLOMA VIRUS FINAL DIAGNOSIS: NORMAL
HUMAN PAPILLOMA VIRUS OTHER HR: NEGATIVE

## 2023-06-21 NOTE — RESULT ENCOUNTER NOTE
Dear Ruthy,   I received your ultrasound results and there was a noted fibroid in the wall of your uterus.  A fibroid can at times cause heavy menstrual bleeding.  If this continues, I recommend further consultation with an OB/GYN specialist.      Please send a [employer] message or call [phone]  if you have any questions.      Cadence Gavin, MINH, CNP  [facility]

## 2023-08-15 ENCOUNTER — E-VISIT (OUTPATIENT)
Dept: FAMILY MEDICINE | Facility: CLINIC | Age: 43
End: 2023-08-15
Payer: COMMERCIAL

## 2023-08-15 DIAGNOSIS — F41.9 ANXIETY: Primary | ICD-10-CM

## 2023-08-15 DIAGNOSIS — F43.21 GRIEF REACTION: ICD-10-CM

## 2023-08-15 PROCEDURE — 99421 OL DIG E/M SVC 5-10 MIN: CPT | Performed by: PHYSICIAN ASSISTANT

## 2023-08-15 RX ORDER — MIRTAZAPINE 7.5 MG/1
7.5 TABLET, FILM COATED ORAL AT BEDTIME
Qty: 90 TABLET | Refills: 0 | Status: SHIPPED | OUTPATIENT
Start: 2023-08-15

## 2023-08-15 ASSESSMENT — ANXIETY QUESTIONNAIRES
6. BECOMING EASILY ANNOYED OR IRRITABLE: MORE THAN HALF THE DAYS
7. FEELING AFRAID AS IF SOMETHING AWFUL MIGHT HAPPEN: SEVERAL DAYS
GAD7 TOTAL SCORE: 15
4. TROUBLE RELAXING: NEARLY EVERY DAY
5. BEING SO RESTLESS THAT IT IS HARD TO SIT STILL: NEARLY EVERY DAY
2. NOT BEING ABLE TO STOP OR CONTROL WORRYING: MORE THAN HALF THE DAYS
3. WORRYING TOO MUCH ABOUT DIFFERENT THINGS: MORE THAN HALF THE DAYS
1. FEELING NERVOUS, ANXIOUS, OR ON EDGE: MORE THAN HALF THE DAYS
GAD7 TOTAL SCORE: 15

## 2023-08-15 ASSESSMENT — PATIENT HEALTH QUESTIONNAIRE - PHQ9
SUM OF ALL RESPONSES TO PHQ QUESTIONS 1-9: 15
10. IF YOU CHECKED OFF ANY PROBLEMS, HOW DIFFICULT HAVE THESE PROBLEMS MADE IT FOR YOU TO DO YOUR WORK, TAKE CARE OF THINGS AT HOME, OR GET ALONG WITH OTHER PEOPLE: EXTREMELY DIFFICULT
SUM OF ALL RESPONSES TO PHQ QUESTIONS 1-9: 15

## 2023-08-16 ASSESSMENT — PATIENT HEALTH QUESTIONNAIRE - PHQ9: SUM OF ALL RESPONSES TO PHQ QUESTIONS 1-9: 15

## 2023-08-16 ASSESSMENT — ANXIETY QUESTIONNAIRES: GAD7 TOTAL SCORE: 15

## 2023-09-10 DIAGNOSIS — F41.9 ANXIETY: ICD-10-CM

## 2023-09-10 DIAGNOSIS — F51.04 PSYCHOPHYSIOLOGICAL INSOMNIA: ICD-10-CM

## 2023-09-11 RX ORDER — ESCITALOPRAM OXALATE 10 MG/1
10 TABLET ORAL DAILY
Qty: 90 TABLET | Refills: 0 | Status: SHIPPED | OUTPATIENT
Start: 2023-09-11

## 2023-09-17 ENCOUNTER — HEALTH MAINTENANCE LETTER (OUTPATIENT)
Age: 43
End: 2023-09-17

## 2024-02-03 ENCOUNTER — HEALTH MAINTENANCE LETTER (OUTPATIENT)
Age: 44
End: 2024-02-03

## 2024-11-09 ENCOUNTER — HEALTH MAINTENANCE LETTER (OUTPATIENT)
Age: 44
End: 2024-11-09

## (undated) DEVICE — SU VICRYL 4-0 PS-2 18" UND J496H

## (undated) DEVICE — GLOVE BIOGEL PI MICRO SZ 7.5 48575

## (undated) DEVICE — GLOVE BIOGEL PI MICRO INDICATOR UNDERGLOVE SZ 8.0 48980

## (undated) DEVICE — ESU GROUND PAD ADULT W/CORD E7507

## (undated) DEVICE — LINEN HALF SHEET 5512

## (undated) DEVICE — SOL WATER IRRIG 1000ML BOTTLE 2F7114

## (undated) DEVICE — GLOVE BIOGEL PI ULTRATOUCH SZ 7.5 41175

## (undated) DEVICE — BNDG ELASTIC 4"X5YDS UNSTERILE 6611-40

## (undated) DEVICE — SU SILK 3-0 SH 30" K832H

## (undated) DEVICE — SET BREAST BIOPSY LOCALIZER 20 PROBE 8MM PENCIL 09-0006

## (undated) DEVICE — LINEN TOWEL PACK X10 5473

## (undated) DEVICE — LINEN FULL SHEET 5511

## (undated) DEVICE — ADH SKIN CLOSURE PREMIERPRO EXOFIN MICOR HV 0.5ML 3471

## (undated) DEVICE — SYR 05ML LL W/O NDL

## (undated) DEVICE — SU VICRYL 3-0 SH 27" UND J416H

## (undated) DEVICE — DRAPE LAP W/ARMBOARD 29410

## (undated) DEVICE — PACK MINOR CUSTOM RIDGES SBA32RMRMA

## (undated) DEVICE — Device

## (undated) DEVICE — BAG CLEAR TRASH 1.3M 39X33" P4040C

## (undated) DEVICE — SOL NACL 0.9% IRRIG 1000ML BOTTLE 2F7124

## (undated) DEVICE — DRSG TEGADERM 4X4 3/4" 1626W

## (undated) DEVICE — NDL 22GA 1.5"

## (undated) DEVICE — PREP CHLORAPREP 26ML TINTED HI-LITE ORANGE 930815

## (undated) RX ORDER — FENTANYL CITRATE 50 UG/ML
INJECTION, SOLUTION INTRAMUSCULAR; INTRAVENOUS
Status: DISPENSED
Start: 2023-02-16

## (undated) RX ORDER — BUPIVACAINE HYDROCHLORIDE 5 MG/ML
INJECTION, SOLUTION EPIDURAL; INTRACAUDAL
Status: DISPENSED
Start: 2023-02-16

## (undated) RX ORDER — GLYCOPYRROLATE 0.2 MG/ML
INJECTION INTRAMUSCULAR; INTRAVENOUS
Status: DISPENSED
Start: 2023-02-16

## (undated) RX ORDER — LIDOCAINE HYDROCHLORIDE 10 MG/ML
INJECTION, SOLUTION EPIDURAL; INFILTRATION; INTRACAUDAL; PERINEURAL
Status: DISPENSED
Start: 2023-02-16

## (undated) RX ORDER — ONDANSETRON 2 MG/ML
INJECTION INTRAMUSCULAR; INTRAVENOUS
Status: DISPENSED
Start: 2023-02-16

## (undated) RX ORDER — KETOROLAC TROMETHAMINE 30 MG/ML
INJECTION, SOLUTION INTRAMUSCULAR; INTRAVENOUS
Status: DISPENSED
Start: 2023-02-16

## (undated) RX ORDER — CEFAZOLIN SODIUM/WATER 2 G/20 ML
SYRINGE (ML) INTRAVENOUS
Status: DISPENSED
Start: 2023-02-16

## (undated) RX ORDER — DEXAMETHASONE SODIUM PHOSPHATE 4 MG/ML
INJECTION, SOLUTION INTRA-ARTICULAR; INTRALESIONAL; INTRAMUSCULAR; INTRAVENOUS; SOFT TISSUE
Status: DISPENSED
Start: 2023-02-16

## (undated) RX ORDER — FENTANYL CITRATE-0.9 % NACL/PF 10 MCG/ML
PLASTIC BAG, INJECTION (ML) INTRAVENOUS
Status: DISPENSED
Start: 2023-02-16